# Patient Record
Sex: FEMALE | Race: WHITE | NOT HISPANIC OR LATINO | Employment: FULL TIME | URBAN - METROPOLITAN AREA
[De-identification: names, ages, dates, MRNs, and addresses within clinical notes are randomized per-mention and may not be internally consistent; named-entity substitution may affect disease eponyms.]

---

## 2023-10-18 ENCOUNTER — APPOINTMENT (OUTPATIENT)
Dept: GENERAL RADIOLOGY | Facility: HOSPITAL | Age: 43
End: 2023-10-18
Payer: COMMERCIAL

## 2023-10-18 ENCOUNTER — APPOINTMENT (OUTPATIENT)
Dept: CT IMAGING | Facility: HOSPITAL | Age: 43
End: 2023-10-18
Payer: COMMERCIAL

## 2023-10-18 ENCOUNTER — HOSPITAL ENCOUNTER (OUTPATIENT)
Facility: HOSPITAL | Age: 43
Setting detail: OBSERVATION
Discharge: HOME | End: 2023-10-19
Attending: EMERGENCY MEDICINE | Admitting: EMERGENCY MEDICINE
Payer: COMMERCIAL

## 2023-10-18 DIAGNOSIS — R55 SYNCOPE, UNSPECIFIED SYNCOPE TYPE: Primary | ICD-10-CM

## 2023-10-18 LAB
ALBUMIN SERPL-MCNC: 4 G/DL (ref 3.5–5.2)
ALBUMIN/GLOB SERPL: 1.3 G/DL
ALP SERPL-CCNC: 67 U/L (ref 39–117)
ALT SERPL W P-5'-P-CCNC: 14 U/L (ref 1–33)
ANION GAP SERPL CALCULATED.3IONS-SCNC: 12 MMOL/L (ref 5–15)
AST SERPL-CCNC: 14 U/L (ref 1–32)
BASOPHILS # BLD AUTO: 0.1 10*3/MM3 (ref 0–0.2)
BASOPHILS NFR BLD AUTO: 0.7 % (ref 0–1.5)
BILIRUB SERPL-MCNC: 0.2 MG/DL (ref 0–1.2)
BUN SERPL-MCNC: 13 MG/DL (ref 6–20)
BUN/CREAT SERPL: 14.8 (ref 7–25)
CALCIUM SPEC-SCNC: 8.9 MG/DL (ref 8.6–10.5)
CHLORIDE SERPL-SCNC: 102 MMOL/L (ref 98–107)
CO2 SERPL-SCNC: 22 MMOL/L (ref 22–29)
CREAT SERPL-MCNC: 0.88 MG/DL (ref 0.57–1)
DEPRECATED RDW RBC AUTO: 45.9 FL (ref 37–54)
EGFRCR SERPLBLD CKD-EPI 2021: 82.7 ML/MIN/1.73
EOSINOPHIL # BLD AUTO: 0.1 10*3/MM3 (ref 0–0.4)
EOSINOPHIL NFR BLD AUTO: 0.6 % (ref 0.3–6.2)
ERYTHROCYTE [DISTWIDTH] IN BLOOD BY AUTOMATED COUNT: 13.3 % (ref 12.3–15.4)
ETHANOL UR QL: <0.01 %
GLOBULIN UR ELPH-MCNC: 3 GM/DL
GLUCOSE SERPL-MCNC: 108 MG/DL (ref 65–99)
HCG SERPL QL: NEGATIVE
HCT VFR BLD AUTO: 39.3 % (ref 34–46.6)
HGB BLD-MCNC: 13.2 G/DL (ref 12–15.9)
HOLD SPECIMEN: NORMAL
LYMPHOCYTES # BLD AUTO: 1.9 10*3/MM3 (ref 0.7–3.1)
LYMPHOCYTES NFR BLD AUTO: 13.8 % (ref 19.6–45.3)
MAGNESIUM SERPL-MCNC: 2 MG/DL (ref 1.6–2.6)
MCH RBC QN AUTO: 31.9 PG (ref 26.6–33)
MCHC RBC AUTO-ENTMCNC: 33.7 G/DL (ref 31.5–35.7)
MCV RBC AUTO: 94.7 FL (ref 79–97)
MONOCYTES # BLD AUTO: 0.9 10*3/MM3 (ref 0.1–0.9)
MONOCYTES NFR BLD AUTO: 7 % (ref 5–12)
NEUTROPHILS NFR BLD AUTO: 10.5 10*3/MM3 (ref 1.7–7)
NEUTROPHILS NFR BLD AUTO: 77.9 % (ref 42.7–76)
NRBC BLD AUTO-RTO: 0 /100 WBC (ref 0–0.2)
PLATELET # BLD AUTO: 368 10*3/MM3 (ref 140–450)
PMV BLD AUTO: 8.1 FL (ref 6–12)
POTASSIUM SERPL-SCNC: 3.9 MMOL/L (ref 3.5–5.2)
PROT SERPL-MCNC: 7 G/DL (ref 6–8.5)
RBC # BLD AUTO: 4.15 10*6/MM3 (ref 3.77–5.28)
SODIUM SERPL-SCNC: 136 MMOL/L (ref 136–145)
TROPONIN T SERPL HS-MCNC: <6 NG/L
TSH SERPL DL<=0.05 MIU/L-ACNC: 1.94 UIU/ML (ref 0.27–4.2)
WBC NRBC COR # BLD: 13.5 10*3/MM3 (ref 3.4–10.8)
WHOLE BLOOD HOLD COAG: NORMAL

## 2023-10-18 PROCEDURE — 70450 CT HEAD/BRAIN W/O DYE: CPT

## 2023-10-18 PROCEDURE — 80053 COMPREHEN METABOLIC PANEL: CPT | Performed by: PHYSICIAN ASSISTANT

## 2023-10-18 PROCEDURE — 84484 ASSAY OF TROPONIN QUANT: CPT | Performed by: PHYSICIAN ASSISTANT

## 2023-10-18 PROCEDURE — 83735 ASSAY OF MAGNESIUM: CPT | Performed by: PHYSICIAN ASSISTANT

## 2023-10-18 PROCEDURE — 85025 COMPLETE CBC W/AUTO DIFF WBC: CPT | Performed by: PHYSICIAN ASSISTANT

## 2023-10-18 PROCEDURE — 93005 ELECTROCARDIOGRAM TRACING: CPT | Performed by: EMERGENCY MEDICINE

## 2023-10-18 PROCEDURE — 36415 COLL VENOUS BLD VENIPUNCTURE: CPT

## 2023-10-18 PROCEDURE — 82077 ASSAY SPEC XCP UR&BREATH IA: CPT | Performed by: PHYSICIAN ASSISTANT

## 2023-10-18 PROCEDURE — 71045 X-RAY EXAM CHEST 1 VIEW: CPT

## 2023-10-18 PROCEDURE — 82607 VITAMIN B-12: CPT | Performed by: NURSE PRACTITIONER

## 2023-10-18 PROCEDURE — 25810000003 SODIUM CHLORIDE 0.9 % SOLUTION: Performed by: PHYSICIAN ASSISTANT

## 2023-10-18 PROCEDURE — 84703 CHORIONIC GONADOTROPIN ASSAY: CPT | Performed by: EMERGENCY MEDICINE

## 2023-10-18 PROCEDURE — 82746 ASSAY OF FOLIC ACID SERUM: CPT | Performed by: NURSE PRACTITIONER

## 2023-10-18 PROCEDURE — 84443 ASSAY THYROID STIM HORMONE: CPT | Performed by: PHYSICIAN ASSISTANT

## 2023-10-18 PROCEDURE — 99284 EMERGENCY DEPT VISIT MOD MDM: CPT

## 2023-10-18 RX ORDER — SODIUM CHLORIDE 0.9 % (FLUSH) 0.9 %
10 SYRINGE (ML) INJECTION AS NEEDED
Status: DISCONTINUED | OUTPATIENT
Start: 2023-10-18 | End: 2023-10-19 | Stop reason: HOSPADM

## 2023-10-18 RX ADMIN — SODIUM CHLORIDE 1000 ML: 9 INJECTION, SOLUTION INTRAVENOUS at 21:59

## 2023-10-19 ENCOUNTER — APPOINTMENT (OUTPATIENT)
Dept: NUCLEAR MEDICINE | Facility: HOSPITAL | Age: 43
End: 2023-10-19
Payer: COMMERCIAL

## 2023-10-19 ENCOUNTER — APPOINTMENT (OUTPATIENT)
Dept: CARDIOLOGY | Facility: HOSPITAL | Age: 43
End: 2023-10-19
Payer: COMMERCIAL

## 2023-10-19 ENCOUNTER — APPOINTMENT (OUTPATIENT)
Dept: GENERAL RADIOLOGY | Facility: HOSPITAL | Age: 43
End: 2023-10-19
Payer: COMMERCIAL

## 2023-10-19 ENCOUNTER — APPOINTMENT (OUTPATIENT)
Dept: RESPIRATORY THERAPY | Facility: HOSPITAL | Age: 43
End: 2023-10-19
Payer: COMMERCIAL

## 2023-10-19 ENCOUNTER — APPOINTMENT (OUTPATIENT)
Dept: MRI IMAGING | Facility: HOSPITAL | Age: 43
End: 2023-10-19
Payer: COMMERCIAL

## 2023-10-19 VITALS
HEART RATE: 62 BPM | DIASTOLIC BLOOD PRESSURE: 65 MMHG | WEIGHT: 190 LBS | OXYGEN SATURATION: 98 % | HEIGHT: 67 IN | RESPIRATION RATE: 18 BRPM | SYSTOLIC BLOOD PRESSURE: 120 MMHG | BODY MASS INDEX: 29.82 KG/M2 | TEMPERATURE: 98.4 F

## 2023-10-19 PROBLEM — R55 SYNCOPE: Status: ACTIVE | Noted: 2023-10-19

## 2023-10-19 LAB
AMPHET+METHAMPHET UR QL: NEGATIVE
ANION GAP SERPL CALCULATED.3IONS-SCNC: 10 MMOL/L (ref 5–15)
BACTERIA UR QL AUTO: ABNORMAL /HPF
BARBITURATES UR QL SCN: NEGATIVE
BASOPHILS # BLD AUTO: 0.1 10*3/MM3 (ref 0–0.2)
BASOPHILS NFR BLD AUTO: 0.5 % (ref 0–1.5)
BENZODIAZ UR QL SCN: NEGATIVE
BH CV ECHO MEAS - ACS: 1.72 CM
BH CV ECHO MEAS - AO MAX PG: 6.5 MMHG
BH CV ECHO MEAS - AO MEAN PG: 3.4 MMHG
BH CV ECHO MEAS - AO ROOT DIAM: 2.8 CM
BH CV ECHO MEAS - AO V2 MAX: 127.6 CM/SEC
BH CV ECHO MEAS - AO V2 VTI: 26.3 CM
BH CV ECHO MEAS - AVA(I,D): 1.72 CM2
BH CV ECHO MEAS - EDV(CUBED): 59.6 ML
BH CV ECHO MEAS - EDV(MOD-SP4): 72.3 ML
BH CV ECHO MEAS - EF(MOD-BP): 59 %
BH CV ECHO MEAS - EF(MOD-SP4): 58.7 %
BH CV ECHO MEAS - ESV(CUBED): 19.7 ML
BH CV ECHO MEAS - ESV(MOD-SP4): 29.8 ML
BH CV ECHO MEAS - FS: 30.9 %
BH CV ECHO MEAS - IVS/LVPW: 1.06 CM
BH CV ECHO MEAS - IVSD: 0.84 CM
BH CV ECHO MEAS - LA DIMENSION: 3.6 CM
BH CV ECHO MEAS - LV DIASTOLIC VOL/BSA (35-75): 36.5 CM2
BH CV ECHO MEAS - LV MASS(C)D: 91.9 GRAMS
BH CV ECHO MEAS - LV MAX PG: 3.4 MMHG
BH CV ECHO MEAS - LV MEAN PG: 1.7 MMHG
BH CV ECHO MEAS - LV SYSTOLIC VOL/BSA (12-30): 15.1 CM2
BH CV ECHO MEAS - LV V1 MAX: 92.5 CM/SEC
BH CV ECHO MEAS - LV V1 VTI: 18.3 CM
BH CV ECHO MEAS - LVIDD: 3.9 CM
BH CV ECHO MEAS - LVIDS: 2.7 CM
BH CV ECHO MEAS - LVOT AREA: 2.47 CM2
BH CV ECHO MEAS - LVOT DIAM: 1.77 CM
BH CV ECHO MEAS - LVPWD: 0.79 CM
BH CV ECHO MEAS - MR MAX PG: 29.2 MMHG
BH CV ECHO MEAS - MR MAX VEL: 269.7 CM/SEC
BH CV ECHO MEAS - MV A MAX VEL: 42.3 CM/SEC
BH CV ECHO MEAS - MV DEC SLOPE: 463.3 CM/SEC2
BH CV ECHO MEAS - MV DEC TIME: 0.16 SEC
BH CV ECHO MEAS - MV E MAX VEL: 74.5 CM/SEC
BH CV ECHO MEAS - MV E/A: 1.76
BH CV ECHO MEAS - MV MAX PG: 3.1 MMHG
BH CV ECHO MEAS - MV MEAN PG: 0.81 MMHG
BH CV ECHO MEAS - MV V2 VTI: 27.7 CM
BH CV ECHO MEAS - MVA(VTI): 1.63 CM2
BH CV ECHO MEAS - PA ACC TIME: 0.14 SEC
BH CV ECHO MEAS - PA V2 MAX: 86.8 CM/SEC
BH CV ECHO MEAS - PULM A REVS DUR: 0.07 SEC
BH CV ECHO MEAS - PULM A REVS VEL: 24.4 CM/SEC
BH CV ECHO MEAS - PULM DIAS VEL: 60.9 CM/SEC
BH CV ECHO MEAS - PULM S/D: 0.96
BH CV ECHO MEAS - PULM SYS VEL: 58.5 CM/SEC
BH CV ECHO MEAS - RAP SYSTOLE: 3 MMHG
BH CV ECHO MEAS - RV MAX PG: 1.63 MMHG
BH CV ECHO MEAS - RV V1 MAX: 63.9 CM/SEC
BH CV ECHO MEAS - RV V1 VTI: 14.5 CM
BH CV ECHO MEAS - RVDD: 3.2 CM
BH CV ECHO MEAS - RVSP: 18.9 MMHG
BH CV ECHO MEAS - SI(MOD-SP4): 21.4 ML/M2
BH CV ECHO MEAS - SV(LVOT): 45.2 ML
BH CV ECHO MEAS - SV(MOD-SP4): 42.4 ML
BH CV ECHO MEAS - TR MAX PG: 15.9 MMHG
BH CV ECHO MEAS - TR MAX VEL: 198.3 CM/SEC
BH CV REST NUCLEAR ISOTOPE DOSE: 11 MCI
BH CV STRESS BP STAGE 1: NORMAL
BH CV STRESS BP STAGE 2: NORMAL
BH CV STRESS BP STAGE 3: NORMAL
BH CV STRESS DURATION MIN STAGE 1: 3
BH CV STRESS DURATION MIN STAGE 2: 3
BH CV STRESS DURATION MIN STAGE 3: 4
BH CV STRESS DURATION SEC STAGE 1: 0
BH CV STRESS DURATION SEC STAGE 2: 0
BH CV STRESS DURATION SEC STAGE 3: 0
BH CV STRESS GRADE STAGE 1: 10
BH CV STRESS GRADE STAGE 2: 12
BH CV STRESS GRADE STAGE 3: 14
BH CV STRESS HR STAGE 1: 95
BH CV STRESS HR STAGE 2: 116
BH CV STRESS HR STAGE 3: 148
BH CV STRESS METS STAGE 1: 5
BH CV STRESS METS STAGE 2: 7.5
BH CV STRESS METS STAGE 3: 10
BH CV STRESS NUCLEAR ISOTOPE DOSE: 33 MCI
BH CV STRESS PROTOCOL 1: NORMAL
BH CV STRESS RECOVERY BP: NORMAL MMHG
BH CV STRESS RECOVERY HR: 98 BPM
BH CV STRESS SPEED STAGE 1: 1.7
BH CV STRESS SPEED STAGE 2: 2.5
BH CV STRESS SPEED STAGE 3: 3.4
BH CV STRESS STAGE 1: 1
BH CV STRESS STAGE 2: 2
BH CV STRESS STAGE 3: 3
BH CV XLRA MEAS LEFT DIST CCA EDV: 24.2 CM/SEC
BH CV XLRA MEAS LEFT DIST CCA PSV: 95.9 CM/SEC
BH CV XLRA MEAS LEFT DIST ICA EDV: -28.1 CM/SEC
BH CV XLRA MEAS LEFT DIST ICA PSV: -77.5 CM/SEC
BH CV XLRA MEAS LEFT ICA/CCA RATIO: 0.99
BH CV XLRA MEAS LEFT PROX CCA EDV: 27.1 CM/SEC
BH CV XLRA MEAS LEFT PROX CCA PSV: 111 CM/SEC
BH CV XLRA MEAS LEFT PROX ECA PSV: -105 CM/SEC
BH CV XLRA MEAS LEFT PROX ICA EDV: -31 CM/SEC
BH CV XLRA MEAS LEFT PROX ICA PSV: -110 CM/SEC
BH CV XLRA MEAS LEFT PROX SCLA PSV: 167 CM/SEC
BH CV XLRA MEAS LEFT VERTEBRAL A EDV: 17.4 CM/SEC
BH CV XLRA MEAS LEFT VERTEBRAL A PSV: 72.6 CM/SEC
BH CV XLRA MEAS RIGHT DIST CCA EDV: 18 CM/SEC
BH CV XLRA MEAS RIGHT DIST CCA PSV: 83.2 CM/SEC
BH CV XLRA MEAS RIGHT DIST ICA EDV: -39.1 CM/SEC
BH CV XLRA MEAS RIGHT DIST ICA PSV: -99.4 CM/SEC
BH CV XLRA MEAS RIGHT ICA/CCA RATIO: 0.93
BH CV XLRA MEAS RIGHT PROX CCA EDV: 24.2 CM/SEC
BH CV XLRA MEAS RIGHT PROX CCA PSV: 106 CM/SEC
BH CV XLRA MEAS RIGHT PROX ECA PSV: -99.4 CM/SEC
BH CV XLRA MEAS RIGHT PROX ICA EDV: -25.5 CM/SEC
BH CV XLRA MEAS RIGHT PROX ICA PSV: -71.4 CM/SEC
BH CV XLRA MEAS RIGHT PROX SCLA PSV: 157 CM/SEC
BH CV XLRA MEAS RIGHT VERTEBRAL A EDV: -14.3 CM/SEC
BH CV XLRA MEAS RIGHT VERTEBRAL A PSV: -56.5 CM/SEC
BILIRUB UR QL STRIP: NEGATIVE
BUN SERPL-MCNC: 11 MG/DL (ref 6–20)
BUN/CREAT SERPL: 14.7 (ref 7–25)
CALCIUM SPEC-SCNC: 8.7 MG/DL (ref 8.6–10.5)
CANNABINOIDS SERPL QL: POSITIVE
CHLORIDE SERPL-SCNC: 106 MMOL/L (ref 98–107)
CLARITY UR: CLEAR
CO2 SERPL-SCNC: 23 MMOL/L (ref 22–29)
COCAINE UR QL: NEGATIVE
COLOR UR: YELLOW
CREAT SERPL-MCNC: 0.75 MG/DL (ref 0.57–1)
DEPRECATED RDW RBC AUTO: 46.4 FL (ref 37–54)
EGFRCR SERPLBLD CKD-EPI 2021: 100.2 ML/MIN/1.73
EOSINOPHIL # BLD AUTO: 0 10*3/MM3 (ref 0–0.4)
EOSINOPHIL NFR BLD AUTO: 0.4 % (ref 0.3–6.2)
ERYTHROCYTE [DISTWIDTH] IN BLOOD BY AUTOMATED COUNT: 13.4 % (ref 12.3–15.4)
FOLATE SERPL-MCNC: 8.85 NG/ML (ref 4.78–24.2)
GEN 5 2HR TROPONIN T REFLEX: <6 NG/L
GLUCOSE SERPL-MCNC: 133 MG/DL (ref 65–99)
GLUCOSE UR STRIP-MCNC: NEGATIVE MG/DL
HBA1C MFR BLD: 5.7 % (ref 4.8–5.6)
HCT VFR BLD AUTO: 37.8 % (ref 34–46.6)
HGB BLD-MCNC: 12.7 G/DL (ref 12–15.9)
HGB UR QL STRIP.AUTO: NEGATIVE
HYALINE CASTS UR QL AUTO: ABNORMAL /LPF
KETONES UR QL STRIP: NEGATIVE
LEUKOCYTE ESTERASE UR QL STRIP.AUTO: NEGATIVE
LYMPHOCYTES # BLD AUTO: 2.4 10*3/MM3 (ref 0.7–3.1)
LYMPHOCYTES NFR BLD AUTO: 20.9 % (ref 19.6–45.3)
MAXIMAL PREDICTED HEART RATE: 175 BPM
MCH RBC QN AUTO: 31.7 PG (ref 26.6–33)
MCHC RBC AUTO-ENTMCNC: 33.5 G/DL (ref 31.5–35.7)
MCV RBC AUTO: 94.4 FL (ref 79–97)
METHADONE UR QL SCN: NEGATIVE
MONOCYTES # BLD AUTO: 0.7 10*3/MM3 (ref 0.1–0.9)
MONOCYTES NFR BLD AUTO: 6.3 % (ref 5–12)
NEUTROPHILS NFR BLD AUTO: 71.9 % (ref 42.7–76)
NEUTROPHILS NFR BLD AUTO: 8.2 10*3/MM3 (ref 1.7–7)
NITRITE UR QL STRIP: POSITIVE
NRBC BLD AUTO-RTO: 0 /100 WBC (ref 0–0.2)
OPIATES UR QL: NEGATIVE
OXYCODONE UR QL SCN: NEGATIVE
PERCENT MAX PREDICTED HR: 84.57 %
PH UR STRIP.AUTO: 7.5 [PH] (ref 5–8)
PLATELET # BLD AUTO: 333 10*3/MM3 (ref 140–450)
PMV BLD AUTO: 8 FL (ref 6–12)
POTASSIUM SERPL-SCNC: 3.8 MMOL/L (ref 3.5–5.2)
PROT UR QL STRIP: NEGATIVE
QT INTERVAL: 394 MS
QTC INTERVAL: 401 MS
RBC # BLD AUTO: 4 10*6/MM3 (ref 3.77–5.28)
RBC # UR STRIP: ABNORMAL /HPF
REF LAB TEST METHOD: ABNORMAL
SODIUM SERPL-SCNC: 139 MMOL/L (ref 136–145)
SP GR UR STRIP: 1.01 (ref 1–1.03)
SQUAMOUS #/AREA URNS HPF: ABNORMAL /HPF
STRESS BASELINE BP: NORMAL MMHG
STRESS BASELINE HR: 70 BPM
STRESS PERCENT HR: 99 %
STRESS POST ESTIMATED WORKLOAD: 10.1 METS
STRESS POST EXERCISE DUR MIN: 10 MIN
STRESS POST EXERCISE DUR SEC: 1 SEC
STRESS POST PEAK BP: NORMAL MMHG
STRESS POST PEAK HR: 148 BPM
STRESS TARGET HR: 149 BPM
TROPONIN T DELTA: NORMAL
UROBILINOGEN UR QL STRIP: ABNORMAL
VIT B12 BLD-MCNC: 318 PG/ML (ref 211–946)
WBC # UR STRIP: ABNORMAL /HPF
WBC NRBC COR # BLD: 11.4 10*3/MM3 (ref 3.4–10.8)

## 2023-10-19 PROCEDURE — 83036 HEMOGLOBIN GLYCOSYLATED A1C: CPT | Performed by: NURSE PRACTITIONER

## 2023-10-19 PROCEDURE — 78452 HT MUSCLE IMAGE SPECT MULT: CPT | Performed by: INTERNAL MEDICINE

## 2023-10-19 PROCEDURE — 99204 OFFICE O/P NEW MOD 45 MIN: CPT | Performed by: INTERNAL MEDICINE

## 2023-10-19 PROCEDURE — 80048 BASIC METABOLIC PNL TOTAL CA: CPT | Performed by: PHYSICIAN ASSISTANT

## 2023-10-19 PROCEDURE — G0378 HOSPITAL OBSERVATION PER HR: HCPCS

## 2023-10-19 PROCEDURE — 70551 MRI BRAIN STEM W/O DYE: CPT

## 2023-10-19 PROCEDURE — 99214 OFFICE O/P EST MOD 30 MIN: CPT | Performed by: NURSE PRACTITIONER

## 2023-10-19 PROCEDURE — 80307 DRUG TEST PRSMV CHEM ANLYZR: CPT | Performed by: PHYSICIAN ASSISTANT

## 2023-10-19 PROCEDURE — 85025 COMPLETE CBC W/AUTO DIFF WBC: CPT | Performed by: PHYSICIAN ASSISTANT

## 2023-10-19 PROCEDURE — A9502 TC99M TETROFOSMIN: HCPCS | Performed by: EMERGENCY MEDICINE

## 2023-10-19 PROCEDURE — 93306 TTE W/DOPPLER COMPLETE: CPT

## 2023-10-19 PROCEDURE — 72050 X-RAY EXAM NECK SPINE 4/5VWS: CPT

## 2023-10-19 PROCEDURE — 93018 CV STRESS TEST I&R ONLY: CPT | Performed by: INTERNAL MEDICINE

## 2023-10-19 PROCEDURE — 0 TECHNETIUM TETROFOSMIN KIT: Performed by: EMERGENCY MEDICINE

## 2023-10-19 PROCEDURE — 93306 TTE W/DOPPLER COMPLETE: CPT | Performed by: INTERNAL MEDICINE

## 2023-10-19 PROCEDURE — 97161 PT EVAL LOW COMPLEX 20 MIN: CPT

## 2023-10-19 PROCEDURE — 93017 CV STRESS TEST TRACING ONLY: CPT

## 2023-10-19 PROCEDURE — 93880 EXTRACRANIAL BILAT STUDY: CPT

## 2023-10-19 PROCEDURE — 78452 HT MUSCLE IMAGE SPECT MULT: CPT

## 2023-10-19 PROCEDURE — 81001 URINALYSIS AUTO W/SCOPE: CPT | Performed by: PHYSICIAN ASSISTANT

## 2023-10-19 RX ORDER — SODIUM CHLORIDE 9 MG/ML
40 INJECTION, SOLUTION INTRAVENOUS AS NEEDED
Status: DISCONTINUED | OUTPATIENT
Start: 2023-10-19 | End: 2023-10-19 | Stop reason: HOSPADM

## 2023-10-19 RX ORDER — ESCITALOPRAM OXALATE 20 MG/1
20 TABLET ORAL DAILY
COMMUNITY

## 2023-10-19 RX ORDER — AMOXICILLIN 250 MG
2 CAPSULE ORAL 2 TIMES DAILY
Status: DISCONTINUED | OUTPATIENT
Start: 2023-10-19 | End: 2023-10-19 | Stop reason: HOSPADM

## 2023-10-19 RX ORDER — ACETAMINOPHEN 325 MG/1
650 TABLET ORAL EVERY 4 HOURS PRN
Status: DISCONTINUED | OUTPATIENT
Start: 2023-10-19 | End: 2023-10-19 | Stop reason: HOSPADM

## 2023-10-19 RX ORDER — SODIUM CHLORIDE 0.9 % (FLUSH) 0.9 %
10 SYRINGE (ML) INJECTION EVERY 12 HOURS SCHEDULED
Status: DISCONTINUED | OUTPATIENT
Start: 2023-10-19 | End: 2023-10-19 | Stop reason: HOSPADM

## 2023-10-19 RX ORDER — POLYETHYLENE GLYCOL 3350 17 G/17G
17 POWDER, FOR SOLUTION ORAL DAILY PRN
Status: DISCONTINUED | OUTPATIENT
Start: 2023-10-19 | End: 2023-10-19 | Stop reason: HOSPADM

## 2023-10-19 RX ORDER — CHOLECALCIFEROL (VITAMIN D3) 125 MCG
5 CAPSULE ORAL NIGHTLY PRN
Status: DISCONTINUED | OUTPATIENT
Start: 2023-10-19 | End: 2023-10-19 | Stop reason: HOSPADM

## 2023-10-19 RX ORDER — ASPIRIN 81 MG/1
81 TABLET ORAL DAILY
COMMUNITY

## 2023-10-19 RX ORDER — SODIUM CHLORIDE 0.9 % (FLUSH) 0.9 %
10 SYRINGE (ML) INJECTION AS NEEDED
Status: DISCONTINUED | OUTPATIENT
Start: 2023-10-19 | End: 2023-10-19 | Stop reason: HOSPADM

## 2023-10-19 RX ORDER — BISACODYL 5 MG/1
5 TABLET, DELAYED RELEASE ORAL DAILY PRN
Status: DISCONTINUED | OUTPATIENT
Start: 2023-10-19 | End: 2023-10-19 | Stop reason: HOSPADM

## 2023-10-19 RX ORDER — ONDANSETRON 2 MG/ML
4 INJECTION INTRAMUSCULAR; INTRAVENOUS EVERY 6 HOURS PRN
Status: DISCONTINUED | OUTPATIENT
Start: 2023-10-19 | End: 2023-10-19 | Stop reason: HOSPADM

## 2023-10-19 RX ORDER — BISACODYL 10 MG
10 SUPPOSITORY, RECTAL RECTAL DAILY PRN
Status: DISCONTINUED | OUTPATIENT
Start: 2023-10-19 | End: 2023-10-19 | Stop reason: HOSPADM

## 2023-10-19 RX ORDER — ACETAMINOPHEN 500 MG
1000 TABLET ORAL ONCE
Status: COMPLETED | OUTPATIENT
Start: 2023-10-19 | End: 2023-10-19

## 2023-10-19 RX ADMIN — TETROFOSMIN 1 DOSE: 1.38 INJECTION, POWDER, LYOPHILIZED, FOR SOLUTION INTRAVENOUS at 10:34

## 2023-10-19 RX ADMIN — ACETAMINOPHEN 650 MG: 325 TABLET, FILM COATED ORAL at 09:52

## 2023-10-19 RX ADMIN — Medication 10 ML: at 01:10

## 2023-10-19 RX ADMIN — ACETAMINOPHEN 1000 MG: 500 TABLET, FILM COATED ORAL at 00:42

## 2023-10-19 RX ADMIN — TETROFOSMIN 1 DOSE: 1.38 INJECTION, POWDER, LYOPHILIZED, FOR SOLUTION INTRAVENOUS at 11:21

## 2023-10-19 RX ADMIN — Medication 10 ML: at 09:54

## 2023-10-19 RX ADMIN — ACETAMINOPHEN 650 MG: 325 TABLET, FILM COATED ORAL at 13:33

## 2023-10-19 NOTE — CASE MANAGEMENT/SOCIAL WORK
Discharge Planning Assessment  [facility]     Patient Name: Dawn Jarrell  MRN: [id number]  Today's Date: 10/19/2023    Admit Date: 10/18/2023    Plan: Return home with family   Discharge Needs Assessment       Row Name 10/19/23 1509       Living Environment    People in Home child(gonsalo), adult;child(gonsalo), dependent    Current Living Arrangements home    Potentially Unsafe Housing Conditions none    Primary Care Provided by self    Provides Primary Care For no one, unable/limited ability to care for self    Family Caregiver if Needed child(gonsalo), adult    Able to Return to Prior Arrangements yes       Resource/Environmental Concerns    Transportation Concerns none       Transition Planning    Patient/Family Anticipated Services at Transition none    Transportation Anticipated car, drives self       Discharge Needs Assessment    Concerns to be Addressed discharge planning    Anticipated Changes Related to Illness none    Equipment Needed After Discharge none                   Discharge Plan       Row Name 10/19/23 0606       Plan    Plan Return home with family    Patient/Family in Agreement with Plan yes    Plan Comments CM met with Ms. Jarrell who lives at home with her adult children, drives and is independent. She does not have a PCP but believes her insurance plan has assigned her to a NP at [facility] in [place]. CM placed contact info on her AVS for Bonnie Jane NP in [place] and she plans to make an appointment. Denies difficulty with transportation or paying for medications.      Row Name 10/19/23 0381       Plan    Plan Needs CM interview. Expected return home    Plan Comments Barriers: CM attempted several times but off floor for ordered testing: Pending Stress test, Echo, MRI Brain, Carotid US. PT eval pending. CM will need to interview if appropriate                  Demographic Summary       Row Name 10/19/23 1417       General Information    Admission Type observation    Arrived From  emergency department    Referral Source admission list    Reason for Consult discharge planning    Preferred Language English       Contact Information    Permission Granted to Share Info With                    Functional Status       Row Name 10/19/23 1505       Functional Status    Usual Activity Tolerance excellent    Current Activity Tolerance good       Functional Status, IADL    Medications independent    Meal Preparation independent    Housekeeping independent    Laundry independent    Shopping independent    IADL Comments independent                    Maintained distance greater than six feet and spent less than 15 minutes in the room.     Yaneli KERR,RN Case Manager  [facility]  Phone: desk- [phone] cell- [phone]

## 2023-10-19 NOTE — CONSULTS
Primary Care Provider: Provider, No Known     Consult requested by:  Dr. Betancourt    Reason for Consultation: Neurological evaluation, syncope     History taken from: patient chart RN    Chief complaint: passed out        SUBJECTIVE:    History of present illness: Background per H&P:  Patient is a 43-year-old female that presents to the ED after a syncopal episode today.  Patient was sitting down with family and said she felt hot and nauseous right before she fainted.  Patient denies syncopal episodes in the past.  Patient lost consciousness for 5 to 10 seconds and possibly hit head on the concrete floor.  She states she has a headache, drowsiness, weakness, blurry vision in the right eye.  Patient states she ate food and was hydrated today.  She had half a beer prior to her syncopal episode.  She denies any cardiac history and denies chest pain or dyspnea.  Patient denies urinary symptoms, abdominal pain, recent travel, exposure to anyone sick, numbness, tingling, tinnitus.     - Portions of the above HPI were copied from previous encounters and edited as appropriate. PMH as detailed below.     HX as above.  Patient states that she was sitting in her cousins garage when all of a sudden she had sudden feelings of feeling hot and nauseous.  That the last thing she can remember.  She felt like that 2 to 3 minutes prior to the episode.  It is unclear how long exactly she was down according to her but she felt very out of it and confused afterwards.  She did hit her head on the concrete floor.  She denies any history of anything similar to this in the past.  No focal neurological deficit, no vision changes, no facial asymmetry.  No changes in medical history, no changes in medicines.  No changes in lifestyle or home stressors.     Review of Systems   Constitutional: Negative.    HENT: Negative.     Eyes:  Negative for visual disturbance.   Respiratory: Negative.     Cardiovascular: Negative.    Gastrointestinal:   Positive for nausea.   Endocrine: Negative.    Genitourinary: Negative.    Musculoskeletal: Negative.    Skin: Negative.    Allergic/Immunologic: Negative.    Neurological:  Positive for syncope. Negative for dizziness, tremors, seizures, facial asymmetry, speech difficulty, weakness, light-headedness, numbness and headaches.   Hematological: Negative.    Psychiatric/Behavioral:  Negative for confusion.       PATIENT HISTORY:  History reviewed. No pertinent past medical history.,   Past Surgical History:   Procedure Laterality Date    HYSTERECTOMY      HYSTEROSCOPY ENDOMETRIAL ABLATION Bilateral    ,   Family History   Problem Relation Age of Onset    Stroke Mother     Colon cancer Father    ,   Social History     Tobacco Use    Smoking status: Every Day     Packs/day: .5     Types: Cigarettes    Smokeless tobacco: Never   Vaping Use    Vaping Use: Never used   Substance Use Topics    Alcohol use: Yes     Alcohol/week: 1.0 standard drink of alcohol     Types: 1 Cans of beer per week     Comment: social drinker    Drug use: Never   ,   Prior to Admission medications    Medication Sig Start Date End Date Taking? Authorizing Provider   aspirin 81 MG EC tablet Take 1 tablet by mouth Daily.   Yes ProviderNicolette MD   escitalopram (LEXAPRO) 20 MG tablet Take 1 tablet by mouth Daily.   Yes Provider, MD Nicolette    Allergies:  Latex    Current Facility-Administered Medications   Medication Dose Route Frequency Provider Last Rate Last Admin    acetaminophen (TYLENOL) tablet 650 mg  650 mg Oral Q4H PRN Salvador Abrams PA        sennosides-docusate (PERICOLACE) 8.6-50 MG per tablet 2 tablet  2 tablet Oral BID Salvador Abrams PA        And    polyethylene glycol (MIRALAX) packet 17 g  17 g Oral Daily PRN Salvador Abrams PA        And    bisacodyl (DULCOLAX) EC tablet 5 mg  5 mg Oral Daily PRN Salvador Abrams PA        And    bisacodyl (DULCOLAX) suppository 10 mg  10 mg Rectal Daily PRN Jose Alberto  ERIKA Purdy        melatonin tablet 5 mg  5 mg Oral Nightly PRN Salvador Abrams PA        ondansetron (ZOFRAN) injection 4 mg  4 mg Intravenous Q6H PRN Salvador Abrams PA        sodium chloride 0.9 % flush 10 mL  10 mL Intravenous PRN Salvador Abrams PA        sodium chloride 0.9 % flush 10 mL  10 mL Intravenous Q12H Salvador Abrams PA   10 mL at 10/19/23 0110    sodium chloride 0.9 % flush 10 mL  10 mL Intravenous PRN Salvador Abrams PA        sodium chloride 0.9 % infusion 40 mL  40 mL Intravenous ERICN Salvador Abrams PA            ________________________________________________________        OBJECTIVE:    PHYSICAL EXAM:    Constitutional: The patient is in no apparent distress, bright awake and alert. There is no shortness of breath.     PSYCHIATRIC: Mood/affect normal, judgement normal, appropriate    HEENT:Normocephalic, atraumatic.     Chest: Breathing unlabored    Cardiac: Regular rate and rhythm.     Extremities:  No clubbing, cyanosis or edema.    NEUROLOGICAL:    Cognition:   Fully oriented.  Fund of knowledge excellent.  Concentration and attention normal.   Language normal with normal comprehension, fluent speech, intact repetition and naming.   Short and long term memory appears intact    Cranial nerves;    II - pupils bilaterally equal reacting to light,  No new Visual field deficits;  Fundoscopic exam- Not able to be done, non-dilated exam  III,IV,VI: EOMI with no diplopia  V: Normal facial sensations  VII: No facial asymmetry,  VIII: No New hearing abnormality  IX, X, XI: normal gag and shoulder shrug;  XII: tongue is in the midline.    Sensory:  Intact to light touch in all extremities.     Motor: Strength 5/5 bilaterally upper and lower extremities. No involuntary movements present. Normal tone and bulk.    Cerebellar: Finger to nose and mirror movements normal bilaterally.    Gait and balance: Deferred.     Physical exam performed by Abigail Sharp  ARNP.  ________________________________________________________   RESULTS REVIEW:    VITAL SIGNS:   Temp:  [98.3 °F (36.8 °C)-98.6 °F (37 °C)] 98.6 °F (37 °C)  Heart Rate:  [58-79] 70  Resp:  [14-21] 17  BP: (108-160)/(70-91) 108/70     LABS:      Lab 10/19/23  0357 10/18/23  2145   WBC 11.40* 13.50*   HEMOGLOBIN 12.7 13.2   HEMATOCRIT 37.8 39.3   PLATELETS 333 368   NEUTROS ABS 8.20* 10.50*   LYMPHS ABS 2.40 1.90   MONOS ABS 0.70 0.90   EOS ABS 0.00 0.10   MCV 94.4 94.7         Lab 10/19/23  0357 10/18/23  2145   SODIUM 139 136   POTASSIUM 3.8 3.9   CHLORIDE 106 102   CO2 23.0 22.0   ANION GAP 10.0 12.0   BUN 11 13   CREATININE 0.75 0.88   EGFR 100.2 82.7   GLUCOSE 133* 108*   CALCIUM 8.7 8.9   MAGNESIUM  --  2.0   TSH  --  1.940         Lab 10/18/23  2145   TOTAL PROTEIN 7.0   ALBUMIN 4.0   GLOBULIN 3.0   ALT (SGPT) 14   AST (SGOT) 14   BILIRUBIN 0.2   ALK PHOS 67         Lab 10/18/23  2357 10/18/23  2145   HSTROP T <6 <6                     Lab Results   Component Value Date    TSH 1.940 10/18/2023       IMAGING STUDIES:  XR Spine Cervical Complete 4 or 5 View    Result Date: 10/19/2023  Impression: No acute osseous abnormality or significant degenerative change. Electronically Signed: Danika Cardenas MD  10/19/2023 12:30 AM EDT  Workstation ID: [id number]    CT Head Without Contrast    Result Date: 10/18/2023  Impression: No acute intracranial process identified. Electronically Signed: Danika Cardenas MD  10/18/2023 11:29 PM EDT  Workstation ID: [id number]    XR Chest 1 View    Result Date: 10/18/2023  No acute cardiopulmonary abnormality. Electronically Signed: Tony Braun MD  10/18/2023 10:17 PM EDT  Workstation ID: [id number]     I reviewed the patient's new clinical results.    ________________________________________________________     PROBLEM LIST:    Syncope            ASSESSMENT/PLAN:      Syncope vs seizure. Pt felt nauseous and hot prior, possible vasovagal but cannot completely rule out seizure. This is a  single event with negative MRI, recommend to monitor.   - CT head: Negative for hemorrhage or mass effect  - MRI brain without reviewed- negative   - Carotid duplex normal flow   - EKG: Sinus rhythm, rate 62  - A1C:5.70, B12: P TSH: 1.940, mag 2.0  - Check Orthostatic Vitals    - Stress test per Cardiology    -  Discussed that with a single event with negative MRI brain, will hold off on starting AED at this time. If patient has another episode in the future, further work up and management will need to be completed.   - Follow up with PCP       SEIZURE/SYNCOPE INSTRUCTIONS:  -Recommended to observe all seizure precautions, including, but not limited to:   -No driving until seizure free for more than 3 months- as per State driving regulation / law;   -Avoid all high-risk activity, Take showers instead of baths, Avoid swimming without observation, Avoid open heat sources, Avoid working at heights, and Avoid engaging in all potentially hazardous activities.   -Patient expressed clear understanding.    2.  Urinary tract infection  -Culture pending, antibiotics per primary    There are no further recommendations. Will sign off, please call with any questions or concerns.    I discussed the patient's findings and my recommendations with patient and family    Emerita Sharp, APRN  10/19/23  09:13 EDT

## 2023-10-19 NOTE — ED NOTES
Nursing report ED to floor  Dawn Jarrell  43 y.o.  female    HPI:   Chief Complaint   Patient presents with    Syncope       Admitting doctor:   Barron Betancourt DO    Admitting diagnosis:   The encounter diagnosis was Syncope, unspecified syncope type.    Code status:   Current Code Status       Date Active Code Status Order ID Comments User Context       10/19/2023 0004 CPR (Attempt to Resuscitate) [id number]  Salvador Abrams PA ED        Question Answer    Code Status (Patient has no pulse and is not breathing) CPR (Attempt to Resuscitate)    Medical Interventions (Patient has pulse or is breathing) Full Support    Level Of Support Discussed With Patient                    Allergies:   Latex    Isolation:  No active isolations     Fall Risk:  Fall Risk Assessment was completed, and patient is at low risk for falls.   Predictive Model Details         1 (Low) Factor Value    Calculated 10/19/2023 00:13 Age 43    Risk of Fall Model Musculoskeletal Assessment WDL     Active Peripheral IV Present     Imaging order in this encounter Present     Drug Use Not Asked     Skin Assessment WDL     Magnesium 2 mg/dL     Mykel Scale not on file     Financial Class Private Insurance     Diastolic BP 75     Peripheral Vascular Assessment WDL     Calcium 8.9 mg/dL     Tobacco Use Not Asked     Gastrointestinal Assessment WDL     Number of Distinct Medication Classes administered 1     Albumin 4 g/dL     Chloride 102 mmol/L     Cardiac Assessment WDL     Respiratory Rate 14     Creatinine 0.88 mg/dL     Days after Admission 0.129     Potassium 3.9 mmol/L     Total Bilirubin 0.2 mg/dL     ALT 14 U/L         Weight:       10/18/23  2105   Weight: 86.2 kg (190 lb)       Intake and Output  No intake or output data in the 24 hours ending 10/19/23 0013    Diet:   Dietary Orders (From admission, onward)       Start     Ordered    10/19/23 0010  Diet: Regular/House Diet; Texture: Regular Texture (IDDSI 7); Fluid  Consistency: Thin (IDDSI 0)  Diet Effective Now        References:    Diet Order [place]   Question Answer Comment   Diets: Regular/House Diet    Texture: Regular Texture (IDDSI 7)    Fluid Consistency: Thin (IDDSI 0)        10/19/23 0009                     Most recent vitals:   Vitals:    10/18/23 2116 10/18/23 2155 10/18/23 2156 10/18/23 2346   BP: 154/88 144/88 160/91 129/75   Patient Position:  Lying Sitting    Pulse: 65 62 79 61   Resp:  21 14    Temp:       SpO2: 100%   99%   Weight:       Height:           Active LDAs/IV Access:   Lines, Drains & Airways       Active LDAs       Name Placement date Placement time Site Days    Peripheral IV 10/18/23 2149 Left Antecubital 10/18/23  2149  Antecubital  less than 1                    Skin Condition:   Skin Assessments (last day)       None             Labs (abnormal labs have a star):   Labs Reviewed   COMPREHENSIVE METABOLIC PANEL - Abnormal; Notable for the following components:       Result Value    Glucose 108 (*)     All other components within normal limits    Narrative:     GFR Normal >60  Chronic Kidney Disease <60  Kidney Failure <15     CBC WITH AUTO DIFFERENTIAL - Abnormal; Notable for the following components:    WBC 13.50 (*)     Neutrophil % 77.9 (*)     Lymphocyte % 13.8 (*)     Neutrophils, Absolute 10.50 (*)     All other components within normal limits   TROPONIN - Normal    Narrative:     High Sensitive Troponin T Reference Range:  <10.0 ng/L- Negative Female for AMI  <15.0 ng/L- Negative Male for AMI  >=10 - Abnormal Female indicating possible myocardial injury.  >=15 - Abnormal Male indicating possible myocardial injury.   Clinicians would have to utilize clinical acumen, EKG, Troponin, and serial changes to determine if it is an Acute Myocardial Infarction or myocardial injury due to an underlying chronic condition.        TSH - Normal   MAGNESIUM - Normal   HCG, SERUM, QUALITATIVE - Normal   ETHANOL    Narrative:     Plasma Ethanol  Clinical Symptoms:    ETOH (%)               Clinical Symptom  .01-.05              No apparent influence  .03-.12              Euphoria, Diminished judgment and attention   .09-.25              Impaired comprehension, Muscle incoordination  .18-.30              Confusion, Staggered gait, Slurred speech  .25-.40              Markedly decreased response to stimuli, unable to stand or                        walk, vomitting, sleep or stupor  .35-.50              Comatose, Anesthesia, Subnormal body temperature       RAINBOW DRAW    Narrative:     The following orders were created for panel order [place] Draw.  Procedure                               Abnormality         Status                     ---------                               -----------         ------                     Gold Top - [facility][[id number]]                                   Final result               Light Blue Top[[id number]]                                   Final result                 Please view results for these tests on the individual orders.   URINALYSIS W/ MICROSCOPIC IF INDICATED (NO CULTURE)   URINE DRUG SCREEN   HIGH SENSITIVITIY TROPONIN T 2HR   CBC AND DIFFERENTIAL    Narrative:     The following orders were created for panel order CBC & Differential.  Procedure                               Abnormality         Status                     ---------                               -----------         ------                     CBC Auto Differential[[id number]]        Abnormal            Final result                 Please view results for these tests on the individual orders.   [facility]   LIGHT BLUE TOP       LOC: Person, Place, Time, and Situation    Telemetry:  Observation Unit    Cardiac Monitoring Ordered: yes    EKG:   ECG 12 Lead Syncope   Preliminary Result   HEART RATE= 62  bpm   RR Interval= 964  ms   WY Interval= 140  ms   P Horizontal Axis= 4  deg   P Front Axis= 46  deg   QRSD Interval= 83  ms   QT Interval= 394  ms   QTcB= 401   ms   QRS Axis= 16  deg   T Wave Axis= 34  deg   - OTHERWISE NORMAL ECG -   Sinus rhythm   Low voltage, precordial leads   No previous ECG available for comparison   Electronically Signed By:    Date and Time of Study: 2023-10-18 21:24:32          Medications Given in the ED:   Medications   sodium chloride 0.9 % flush 10 mL (has no administration in time range)   acetaminophen (TYLENOL) tablet 1,000 mg (has no administration in time range)   sodium chloride 0.9 % bolus 1,000 mL (0 mL Intravenous Stopped 10/18/23 2229)       Imaging results:  CT Head Without Contrast    Result Date: 10/18/2023  Impression: No acute intracranial process identified. Electronically Signed: Danika Cardenas MD  10/18/2023 11:29 PM EDT  Workstation ID: [id number]    XR Chest 1 View    Result Date: 10/18/2023  No acute cardiopulmonary abnormality. Electronically Signed: Tony Braun MD  10/18/2023 10:17 PM EDT  Workstation ID: [id number]     Social issues:   Social History     Socioeconomic History    Marital status: Single       NIH Stroke Scale:  Interval: (not recorded)  1a. Level of Consciousness: (not recorded)  1b. LOC Questions: (not recorded)  1c. LOC Commands: (not recorded)  2. Best Gaze: (not recorded)  3. Visual: (not recorded)  4. Facial Palsy: (not recorded)  5a. Motor Arm, Left: (not recorded)  5b. Motor Arm, Right: (not recorded)  6a. Motor Leg, Left: (not recorded)  6b. Motor Leg, Right: (not recorded)  7. Limb Ataxia: (not recorded)  8. Sensory: (not recorded)  9. Best Language: (not recorded)  10. Dysarthria: (not recorded)  11. Extinction and Inattention (formerly Neglect): (not recorded)    Total (NIH Stroke Scale): (not recorded)     Additional notable assessment information:     Nursing report ED to floor:      Homer Sosa RN   10/19/23 00:13 EDT

## 2023-10-19 NOTE — ED PROVIDER NOTES
Subjective   History of Present Illness  Patient is a 43-year-old female that presents to the ED after a syncopal episode today.  Patient was sitting down with family and said she felt hot and nauseous right before she fainted.  Patient denies syncopal episodes in the past.  Patient lost consciousness for 5 to 10 seconds and possibly hit head on the concrete floor.  She states she has a headache, drowsiness, weakness, blurry vision in the right eye.  Patient states she ate food and was hydrated today.  She had half a beer prior to her syncopal episode.  She denies any cardiac history and denies chest pain or dyspnea.  Patient denies urinary symptoms, abdominal pain, recent travel, exposure to anyone sick, numbness, tingling, tinnitus.        Review of Systems   HENT:  Negative for tinnitus.    Eyes:  Positive for visual disturbance.   Respiratory:  Negative for shortness of breath.    Cardiovascular:  Negative for chest pain.   Gastrointestinal:  Positive for nausea. Negative for abdominal pain, diarrhea and vomiting.   Genitourinary:  Negative for dysuria, frequency and urgency.   Musculoskeletal:  Negative for back pain and neck pain.   Neurological:  Positive for syncope, weakness, light-headedness and headaches. Negative for speech difficulty and numbness.       History reviewed. No pertinent past medical history.    Allergies   Allergen Reactions    Latex Unknown - Low Severity       History reviewed. No pertinent surgical history.    History reviewed. No pertinent family history.    Social History     Socioeconomic History    Marital status: Single           Objective   Physical Exam  Vitals and nursing note reviewed.   Constitutional:       General: She is not in acute distress.     Appearance: She is well-developed. She is ill-appearing. She is not toxic-appearing or diaphoretic.   HENT:      Head: Normocephalic and atraumatic.      Mouth/Throat:      Mouth: Mucous membranes are moist.      Pharynx:  "Oropharynx is clear.   Eyes:      General: No scleral icterus.     Extraocular Movements: Extraocular movements intact.      Pupils: Pupils are equal, round, and reactive to light.   Cardiovascular:      Rate and Rhythm: Normal rate and regular rhythm.      Pulses: Normal pulses.      Heart sounds: No murmur heard.     No friction rub. No gallop.   Pulmonary:      Effort: Pulmonary effort is normal. No respiratory distress.      Breath sounds: Normal breath sounds. No stridor. No wheezing, rhonchi or rales.   Chest:      Chest wall: No tenderness.   Abdominal:      General: Bowel sounds are normal. There is no distension. There are no signs of injury.      Palpations: Abdomen is soft.      Tenderness: There is no abdominal tenderness. There is no right CVA tenderness, left CVA tenderness, guarding or rebound.      Hernia: No hernia is present.   Skin:     General: Skin is warm.      Capillary Refill: Capillary refill takes less than 2 seconds.      Coloration: Skin is not cyanotic, jaundiced or pale.      Findings: No rash.   Neurological:      General: No focal deficit present.      Mental Status: She is alert and oriented to person, place, and time.      GCS: GCS eye subscore is 4. GCS verbal subscore is 5. GCS motor subscore is 6.      Comments: Equal weak  strength bilaterally   Psychiatric:         Mood and Affect: Mood normal.         Behavior: Behavior normal.         Procedures           ED Course    /75   Pulse 61   Temp 98.5 °F (36.9 °C)   Resp 14   Ht 172.7 cm (68\")   Wt 86.2 kg (190 lb)   SpO2 99%   BMI 28.89 kg/m²   Medications   sodium chloride 0.9 % flush 10 mL (has no administration in time range)   acetaminophen (TYLENOL) tablet 1,000 mg (has no administration in time range)   sodium chloride 0.9 % flush 10 mL (has no administration in time range)   sodium chloride 0.9 % flush 10 mL (has no administration in time range)   sodium chloride 0.9 % infusion 40 mL (has no administration " in time range)   acetaminophen (TYLENOL) tablet 650 mg (has no administration in time range)   sennosides-docusate (PERICOLACE) 8.6-50 MG per tablet 2 tablet (has no administration in time range)     And   polyethylene glycol (MIRALAX) packet 17 g (has no administration in time range)     And   bisacodyl (DULCOLAX) EC tablet 5 mg (has no administration in time range)     And   bisacodyl (DULCOLAX) suppository 10 mg (has no administration in time range)   ondansetron (ZOFRAN) injection 4 mg (has no administration in time range)   melatonin tablet 5 mg (has no administration in time range)   sodium chloride 0.9 % bolus 1,000 mL (0 mL Intravenous Stopped 10/18/23 2229)     Labs Reviewed   COMPREHENSIVE METABOLIC PANEL - Abnormal; Notable for the following components:       Result Value    Glucose 108 (*)     All other components within normal limits    Narrative:     GFR Normal >60  Chronic Kidney Disease <60  Kidney Failure <15     CBC WITH AUTO DIFFERENTIAL - Abnormal; Notable for the following components:    WBC 13.50 (*)     Neutrophil % 77.9 (*)     Lymphocyte % 13.8 (*)     Neutrophils, Absolute 10.50 (*)     All other components within normal limits   TROPONIN - Normal    Narrative:     High Sensitive Troponin T Reference Range:  <10.0 ng/L- Negative Female for AMI  <15.0 ng/L- Negative Male for AMI  >=10 - Abnormal Female indicating possible myocardial injury.  >=15 - Abnormal Male indicating possible myocardial injury.   Clinicians would have to utilize clinical acumen, EKG, Troponin, and serial changes to determine if it is an Acute Myocardial Infarction or myocardial injury due to an underlying chronic condition.        TSH - Normal   MAGNESIUM - Normal   HCG, SERUM, QUALITATIVE - Normal   ETHANOL    Narrative:     Plasma Ethanol Clinical Symptoms:    ETOH (%)               Clinical Symptom  .01-.05              No apparent influence  .03-.12              Euphoria, Diminished judgment and attention   .09-.25               Impaired comprehension, Muscle incoordination  .18-.30              Confusion, Staggered gait, Slurred speech  .25-.40              Markedly decreased response to stimuli, unable to stand or                        walk, vomitting, sleep or stupor  .35-.50              Comatose, Anesthesia, Subnormal body temperature       RAINBOW DRAW    Narrative:     The following orders were created for panel order [place] Draw.  Procedure                               Abnormality         Status                     ---------                               -----------         ------                     [facility] - [facility][[id number]]                                   Final result               Light Blue Top[[id number]]                                   Final result                 Please view results for these tests on the individual orders.   HIGH SENSITIVITIY TROPONIN T 2HR    Narrative:     High Sensitive Troponin T Reference Range:  <10.0 ng/L- Negative Female for AMI  <15.0 ng/L- Negative Male for AMI  >=10 - Abnormal Female indicating possible myocardial injury.  >=15 - Abnormal Male indicating possible myocardial injury.   Clinicians would have to utilize clinical acumen, EKG, Troponin, and serial changes to determine if it is an Acute Myocardial Infarction or myocardial injury due to an underlying chronic condition.        URINALYSIS W/ MICROSCOPIC IF INDICATED (NO CULTURE)   URINE DRUG SCREEN   BASIC METABOLIC PANEL   CBC WITH AUTO DIFFERENTIAL   CBC AND DIFFERENTIAL    Narrative:     The following orders were created for panel order CBC & Differential.  Procedure                               Abnormality         Status                     ---------                               -----------         ------                     CBC Auto Differential[[id number]]        Abnormal            Final result                 Please view results for these tests on the individual orders.   [facility] - [facility]   LIGHT BLUE TOP     XR  Spine Cervical Complete 4 or 5 View   Final Result   Impression:   No acute osseous abnormality or significant degenerative change.         Electronically Signed: Danika Cardenas MD     10/19/2023 12:30 AM EDT     Workstation ID: [id number]      CT Head Without Contrast   Final Result   Impression:   No acute intracranial process identified.            Electronically Signed: Danika Cardenas MD     10/18/2023 11:29 PM EDT     Workstation ID: [id number]      XR Chest 1 View   Final Result   No acute cardiopulmonary abnormality.            Electronically Signed: Tony Braun MD     10/18/2023 10:17 PM EDT     Workstation ID: [id number]                                               Medical Decision Making  Chart Review: No pertinent charts to review    Comorbidity: As per past medical history  Differentials: Arrhythmia, electrolyte abnormality, ACS, orthostatic hypotension     ;this list is not all inclusive and does not constitute the entirety of considered causes  EKG: Interpreted by myself and Dr. Betancourt sinus rhythm rate 62 low voltage in precordial leads  Labs: As above  Radiology: My interpretation x-ray shows no acute infiltrate or pneumothorax, CT head shows no obvious brain bleed correlated with radiologist interpretations as below  CT Head Without Contrast   Final Result    Impression:    No acute intracranial process identified.          Electronically Signed: Danika Cardenas MD      10/18/2023 11:29 PM EDT      Workstation ID: [id number]     XR Chest 1 View   Final Result    No acute cardiopulmonary abnormality.        Electronically Signed: Tony Braun MD      10/18/2023 10:17 PM EDT      Workstation ID: [id number]     Disposition/Treatment:  Appropriate PPE was worn during exam and throughout all encounters with the patient.  Patient presented to the ED with reports of syncopal episode.  No seizure-like activity was reported.  No history of seizures.  Patient has no medical problems.  She is alert and oriented  generally looks somewhat ill-appearing and weak.  She has no acute cranial focal neural deficits on exam.  EKG showed no acute STEMI.  Orthostatics unremarkable for orthostatic hypotension.  She was given fluids while in the ED upon reassessment is now sitting up resting comfortably does look somewhat better seems to be more alert.  Initially was complaining of no pain now reports somewhat of a headache and neck pain.  X-ray cervical spine was added on and showed no acute osseous abnormalities.    CBC shows white count 13.5 no signs of anemia.  Metabolic panel shows glucose 108 otherwise normal.  TSH and magnesium normal.  Initial troponin less than 6.  Ethanol unremarkable.  hCG negative.  Urinalysis and urine drug currently pending.  Patient's syncopal episode will be admitted for cardiac work-up.  Case is also discussed with attending Dr Betancourt will also have neurology and cardiology see her in the morning.  Cardiogram was also ordered for the morning.  I see no focal deficits on exam today.  Findings were discussed with the patient and family at bedside who are in agreement with this plan.  All questions were answered.    Problems Addressed:  Syncope, unspecified syncope type: complicated acute illness or injury    Amount and/or Complexity of Data Reviewed  Labs: ordered. Decision-making details documented in ED Course.  Radiology: ordered. Decision-making details documented in ED Course.  ECG/medicine tests: ordered.    Risk  OTC drugs.  Prescription drug management.  Decision regarding hospitalization.        Final diagnoses:   Syncope, unspecified syncope type       ED Disposition  ED Disposition       ED Disposition   Decision to Admit    Condition   --    Comment   --               No follow-up provider specified.       Medication List      No changes were made to your prescriptions during this visit.            Salvador Abrams PA  10/19/23 0040

## 2023-10-19 NOTE — CONSULTS
Referring Provider: Barron Betancourt DO  Reason for Consultation:  Syncope    Patient Care Team:  Provider, No Known as PCP - General    Chief complaint  Syncope    Subjective .     History of present illness:  Dawn Jarrell is a 43 y.o. female who presents with history of syncopal episode.  Patient apparently was sitting down with family and she felt hot and nauseous and had fainted.  Denies having any episodes in the past.  May be she lost consciousness for 5 to 10 seconds.  Apparently hit her head on the concrete floor.  Denies having any chest discomfort or heaviness or tightness in the chest.  Denies having any shortness of breath.  No other associated aggravating or elevating factors.  Cardiology consultation was requested.        ROS      The patient has been without any chest discomfort, shortness of breath, palpitations, dizziness denies having any headache, abdominal pain, nausea, vomiting, diarrhea, constipation, loss of weight or loss of appetite.  Denies having any excessive bruising, hematuria or blood in the stool.    Review of all systems negative except as indicated      History  History reviewed. No pertinent past medical history.    Past Surgical History:   Procedure Laterality Date    HYSTERECTOMY      HYSTEROSCOPY ENDOMETRIAL ABLATION Bilateral        Family History   Problem Relation Age of Onset    Stroke Mother     Colon cancer Father        Social History     Tobacco Use    Smoking status: Every Day     Packs/day: .5     Types: Cigarettes    Smokeless tobacco: Never   Vaping Use    Vaping Use: Never used   Substance Use Topics    Alcohol use: Yes     Alcohol/week: 1.0 standard drink of alcohol     Types: 1 Cans of beer per week     Comment: social drinker    Drug use: Never        Medications Prior to Admission   Medication Sig Dispense Refill Last Dose    aspirin 81 MG EC tablet Take 1 tablet by mouth Daily.   10/18/2023    escitalopram (LEXAPRO) 20 MG tablet Take 1 tablet by  "mouth Daily.   10/18/2023         Latex    Scheduled Meds:senna-docusate sodium, 2 tablet, Oral, BID  sodium chloride, 10 mL, Intravenous, Q12H      Continuous Infusions:   PRN Meds:.  acetaminophen    senna-docusate sodium **AND** polyethylene glycol **AND** bisacodyl **AND** bisacodyl    melatonin    ondansetron    [COMPLETED] Insert Peripheral IV **AND** sodium chloride    sodium chloride    sodium chloride    Objective     VITAL SIGNS  Vitals:    10/18/23 2346 10/19/23 0040 10/19/23 0453 10/19/23 0555   BP: 129/75 125/82 125/82 108/70   BP Location:  Right arm Right arm Right arm   Patient Position:  Lying Lying Lying   Pulse: 61 58 58 70   Resp:  19 19 17   Temp:   98.3 °F (36.8 °C) 98.6 °F (37 °C)   TempSrc:   Oral Oral   SpO2: 99% 98% 98% 97%   Weight:       Height:   172.7 cm (67.99\")        Flowsheet Rows      Flowsheet Row First Filed Value   Admission Height 172.7 cm (68\") Documented at 10/18/2023 2105   Admission Weight 86.2 kg (190 lb) Documented at 10/18/2023 2105            No intake or output data in the 24 hours ending 10/19/23 0630     TELEMETRY: Sinus rhythm    Physical Exam:  The patient is alert, oriented and in no distress.  Vital signs as noted above.  Head and neck revealed no carotid bruits or jugular venous distention.  No thyromegaly or lymphadenopathy is present  Lungs clear.  No wheezing.  Breath sounds are normal bilaterally.  Heart normal first and second heart sounds. No murmur.  No precordial rub is present.  No gallop is present.  Abdomen soft and nontender.  No organomegaly is present.  Extremities with good peripheral pulses without any pedal edema.  Skin warm and dry.  Musculoskeletal system is grossly normal  CNS grossly normal    Reviewed and updated.    Results Review:   I reviewed the patient's new clinical results.  Lab Results (last 24 hours)       Procedure Component Value Units Date/Time    Basic Metabolic Panel [[id number]]  (Abnormal) Collected: 10/19/23 0357    " Specimen: Blood from Arm, Right Updated: 10/19/23 0446     Glucose 133 mg/dL      BUN 11 mg/dL      Creatinine 0.75 mg/dL      Sodium 139 mmol/L      Potassium 3.8 mmol/L      Comment: Slight hemolysis detected by analyzer. Results may be affected.        Chloride 106 mmol/L      CO2 23.0 mmol/L      Calcium 8.7 mg/dL      BUN/Creatinine Ratio 14.7     Anion Gap 10.0 mmol/L      eGFR 100.2 mL/min/1.73     Narrative:      GFR Normal >60  Chronic Kidney Disease <60  Kidney Failure <15      CBC Auto Differential [[id number]]  (Abnormal) Collected: 10/19/23 0357    Specimen: Blood from Arm, Right Updated: 10/19/23 0417     WBC 11.40 10*3/mm3      RBC 4.00 10*6/mm3      Hemoglobin 12.7 g/dL      Hematocrit 37.8 %      MCV 94.4 fL      MCH 31.7 pg      MCHC 33.5 g/dL      RDW 13.4 %      RDW-SD 46.4 fl      MPV 8.0 fL      Platelets 333 10*3/mm3      Neutrophil % 71.9 %      Lymphocyte % 20.9 %      Monocyte % 6.3 %      Eosinophil % 0.4 %      Basophil % 0.5 %      Neutrophils, Absolute 8.20 10*3/mm3      Lymphocytes, Absolute 2.40 10*3/mm3      Monocytes, Absolute 0.70 10*3/mm3      Eosinophils, Absolute 0.00 10*3/mm3      Basophils, Absolute 0.10 10*3/mm3      nRBC 0.0 /100 WBC     High Sensitivity Troponin T 2Hr [[id number]] Collected: 10/18/23 2357    Specimen: Blood Updated: 10/19/23 0032     HS Troponin T <6 ng/L      Troponin T Delta --     Comment: Unable to calculate.       Narrative:      High Sensitive Troponin T Reference Range:  <10.0 ng/L- Negative Female for AMI  <15.0 ng/L- Negative Male for AMI  >=10 - Abnormal Female indicating possible myocardial injury.  >=15 - Abnormal Male indicating possible myocardial injury.   Clinicians would have to utilize clinical acumen, EKG, Troponin, and serial changes to determine if it is an Acute Myocardial Infarction or myocardial injury due to an underlying chronic condition.         hCG, Serum, Qualitative [[id number]]  (Normal) Collected: 10/18/23 2145    Specimen:  Blood Updated: 10/18/23 2348     HCG Qualitative Negative    [place] Draw [[id number]] Collected: 10/18/23 2145    Specimen: Blood Updated: 10/18/23 2247    Narrative:      The following orders were created for panel order [place] Draw.  Procedure                               Abnormality         Status                     ---------                               -----------         ------                     Gold Top - SST[[id number]]                                   Final result               Light Blue Top[[id number]]                                   Final result                 Please view results for these tests on the individual orders.    Gold Top - SST [[id number]] Collected: 10/18/23 2145    Specimen: Blood Updated: 10/18/23 2247     Extra Tube Hold for add-ons.     Comment: Auto resulted.       Light Blue Top [[id number]] Collected: 10/18/23 2145    Specimen: Blood Updated: 10/18/23 2247     Extra Tube Hold for add-ons.     Comment: Auto resulted       High Sensitivity Troponin T [[id number]]  (Normal) Collected: 10/18/23 2145    Specimen: Blood Updated: 10/18/23 2227     HS Troponin T <6 ng/L     Narrative:      High Sensitive Troponin T Reference Range:  <10.0 ng/L- Negative Female for AMI  <15.0 ng/L- Negative Male for AMI  >=10 - Abnormal Female indicating possible myocardial injury.  >=15 - Abnormal Male indicating possible myocardial injury.   Clinicians would have to utilize clinical acumen, EKG, Troponin, and serial changes to determine if it is an Acute Myocardial Infarction or myocardial injury due to an underlying chronic condition.         TSH [[id number]]  (Normal) Collected: 10/18/23 2145    Specimen: Blood Updated: 10/18/23 2227     TSH 1.940 uIU/mL     Comprehensive Metabolic Panel [[id number]]  (Abnormal) Collected: 10/18/23 2145    Specimen: Blood Updated: 10/18/23 2220     Glucose 108 mg/dL      BUN 13 mg/dL      Creatinine 0.88 mg/dL      Sodium 136 mmol/L      Potassium 3.9 mmol/L       Chloride 102 mmol/L      CO2 22.0 mmol/L      Calcium 8.9 mg/dL      Total Protein 7.0 g/dL      Albumin 4.0 g/dL      ALT (SGPT) 14 U/L      AST (SGOT) 14 U/L      Alkaline Phosphatase 67 U/L      Total Bilirubin 0.2 mg/dL      Globulin 3.0 gm/dL      A/G Ratio 1.3 g/dL      BUN/Creatinine Ratio 14.8     Anion Gap 12.0 mmol/L      eGFR 82.7 mL/min/1.73     Narrative:      GFR Normal >60  Chronic Kidney Disease <60  Kidney Failure <15      Ethanol [[id number]] Collected: 10/18/23 2145    Specimen: Blood Updated: 10/18/23 2220     Ethanol % <0.010 %     Narrative:      Plasma Ethanol Clinical Symptoms:    ETOH (%)               Clinical Symptom  .01-.05              No apparent influence  .03-.12              Euphoria, Diminished judgment and attention   .09-.25              Impaired comprehension, Muscle incoordination  .18-.30              Confusion, Staggered gait, Slurred speech  .25-.40              Markedly decreased response to stimuli, unable to stand or                        walk, vomitting, sleep or stupor  .35-.50              Comatose, Anesthesia, Subnormal body temperature        Magnesium [[id number]]  (Normal) Collected: 10/18/23 2145    Specimen: Blood Updated: 10/18/23 2220     Magnesium 2.0 mg/dL     CBC & Differential [[id number]]  (Abnormal) Collected: 10/18/23 2145    Specimen: Blood Updated: 10/18/23 2202    Narrative:      The following orders were created for panel order CBC & Differential.  Procedure                               Abnormality         Status                     ---------                               -----------         ------                     CBC Auto Differential[[id number]]        Abnormal            Final result                 Please view results for these tests on the individual orders.    CBC Auto Differential [[id number]]  (Abnormal) Collected: 10/18/23 2145    Specimen: Blood Updated: 10/18/23 2202     WBC 13.50 10*3/mm3      RBC 4.15 10*6/mm3      Hemoglobin 13.2 g/dL       Hematocrit 39.3 %      MCV 94.7 fL      MCH 31.9 pg      MCHC 33.7 g/dL      RDW 13.3 %      RDW-SD 45.9 fl      MPV 8.1 fL      Platelets 368 10*3/mm3      Neutrophil % 77.9 %      Lymphocyte % 13.8 %      Monocyte % 7.0 %      Eosinophil % 0.6 %      Basophil % 0.7 %      Neutrophils, Absolute 10.50 10*3/mm3      Lymphocytes, Absolute 1.90 10*3/mm3      Monocytes, Absolute 0.90 10*3/mm3      Eosinophils, Absolute 0.10 10*3/mm3      Basophils, Absolute 0.10 10*3/mm3      nRBC 0.0 /100 WBC             Imaging Results (Last 24 Hours)       Procedure Component Value Units Date/Time    XR Spine Cervical Complete 4 or 5 View [[id number]] Collected: 10/19/23 0029     Updated: 10/19/23 0032    Narrative:      XR SPINE CERVICAL COMPLETE 4 OR 5 VW    Date of Exam: 10/19/2023 12:11 AM EDT    Indication: neck pain    Comparison: None available.    Findings:  There are 7 cervical type vertebral bodies in anatomic alignment. No evidence of fracture or compression deformity.  No significant spondylolisthesis. No significant degenerative changes are present throughout the spine. No evidence of significant   neuroforaminal stenosis. The prevertebral soft tissues appear within normal limits.      Impression:      Impression:  No acute osseous abnormality or significant degenerative change.      Electronically Signed: Danika Cardenas MD    10/19/2023 12:30 AM EDT    Workstation ID: [id number]    CT Head Without Contrast [[id number]] Collected: 10/18/23 2328     Updated: 10/18/23 2331    Narrative:      CT HEAD WO CONTRAST    Date of Exam: 10/18/2023 11:15 PM EDT    Indication: syncope and collapse.    Comparison: None available.    Technique: Axial CT images were obtained of the head without contrast administration.  Coronal reconstructions were performed.  Automated exposure control and iterative reconstruction methods were used.      Findings:  There is no evidence of hemorrhage. There is no mass effect or midline shift.    There is  no extracerebral collection.    Ventricles are normal in size and configuration for patient's stated age.      Posterior fossa is within normal limits.    Calvarium and skull base appear intact.   Visualized sinuses show no air fluid levels. Visualized orbits are unremarkable.      Impression:      Impression:  No acute intracranial process identified.        Electronically Signed: Danika Cardenas MD    10/18/2023 11:29 PM EDT    Workstation ID: [id number]    XR Chest 1 View [[id number]] Collected: 10/18/23 2217     Updated: 10/18/23 2219    Narrative:      XR CHEST 1 VW    Date of Exam: 10/18/2023 9:41 PM EDT    Indication: syncope    Comparison: None available.    Findings:  Lungs are clear without focal consolidation, overt edema, large effusion or pneumothorax. Heart size within normal limits. Osseous structures are grossly unremarkable.        Impression:      No acute cardiopulmonary abnormality.        Electronically Signed: Tony Braun MD    10/18/2023 10:17 PM EDT    Workstation ID: [id number]        LAB RESULTS (LAST 7 DAYS)    CBC  Results from last 7 days   Lab Units 10/19/23  0357 10/18/23  2145   WBC 10*3/mm3 11.40* 13.50*   RBC 10*6/mm3 4.00 4.15   HEMOGLOBIN g/dL 12.7 13.2   HEMATOCRIT % 37.8 39.3   MCV fL 94.4 94.7   PLATELETS 10*3/mm3 333 368       BMP  Results from last 7 days   Lab Units 10/19/23  0357 10/18/23  2145   SODIUM mmol/L 139 136   POTASSIUM mmol/L 3.8 3.9   CHLORIDE mmol/L 106 102   CO2 mmol/L 23.0 22.0   BUN mg/dL 11 13   CREATININE mg/dL 0.75 0.88   GLUCOSE mg/dL 133* 108*   MAGNESIUM mg/dL  --  2.0       CMP   Results from last 7 days   Lab Units 10/19/23  0357 10/18/23  2145   SODIUM mmol/L 139 136   POTASSIUM mmol/L 3.8 3.9   CHLORIDE mmol/L 106 102   CO2 mmol/L 23.0 22.0   BUN mg/dL 11 13   CREATININE mg/dL 0.75 0.88   GLUCOSE mg/dL 133* 108*   ALBUMIN g/dL  --  4.0   BILIRUBIN mg/dL  --  0.2   ALK PHOS U/L  --  67   AST (SGOT) U/L  --  14   ALT (SGPT) U/L  --  14         BNP         TROPONIN  Results from last 7 days   Lab Units 10/18/23  2357   HSTROP T ng/L <6       CoAg        Creatinine Clearance  Estimated Creatinine Clearance: 108.9 mL/min (by C-G formula based on SCr of 0.75 mg/dL).    ABG        Radiology  XR Spine Cervical Complete 4 or 5 View    Result Date: 10/19/2023  Impression: No acute osseous abnormality or significant degenerative change. Electronically Signed: Danika Cardenas MD  10/19/2023 12:30 AM EDT  Workstation ID: [id number]    CT Head Without Contrast    Result Date: 10/18/2023  Impression: No acute intracranial process identified. Electronically Signed: Danika Cardenas MD  10/18/2023 11:29 PM EDT  Workstation ID: [id number]    XR Chest 1 View    Result Date: 10/18/2023  No acute cardiopulmonary abnormality. Electronically Signed: Tony Braun MD  10/18/2023 10:17 PM EDT  Workstation ID: [id number]       EKG            I personally viewed and interpreted the patient's EKG/Telemetry data: Sinus rhythm    ECHOCARDIOGRAM:        STRESS TEST        Cardiolite (Tc-99m sestamibi) stress test    HEART CATHETERIZATION  No results found for this or any previous visit.      OTHER:     Assessment & Plan     Principal Problem:    Syncope    ]]]]]]]]]]]]]]]]]]]]]]]  Impression  ===========  -Syncopal episode x1.  Associated nausea and being hot.  Cannot exclude vasovagal episode.  EKG showed no acute changes.  Troponin levels are negative.  CT scan of the head was negative.    Status post hysterectomy    - Smoker    - Allergic to latex  ===========  Plan  =========  Syncope  Stress Cardiolite test  Echocardiogram  Close cardiac monitoring.    Consider 30-day event monitor at the time of discharge if no definite etiology for recent syncopal episode was found.    Further plan will depend on patient's progress.  ]]]]]]]]]]]]]]]]]]           Robbie Mei MD  10/19/23  06:30 EDT

## 2023-10-19 NOTE — SIGNIFICANT NOTE
Attempted to see pt for PT eval this date.  Per RN pt has been off the floor since 10:00 and continues to be off the floor for testing.  Pt has plans for additional testing later this afternoon

## 2023-10-19 NOTE — PLAN OF CARE
Goal Outcome Evaluation:            Patient transferred to observation unit at this time.

## 2023-10-19 NOTE — CASE MANAGEMENT/SOCIAL WORK
Continued Stay Note  [facility]     Patient Name: Dawn Jarrell  MRN: [id number]  Today's Date: 10/19/2023    Admit Date: 10/18/2023    Plan: Needs CM interview. Expected return home   Discharge Plan       Row Name 10/19/23 1310       Plan    Plan Needs CM interview. Expected return home    Plan Comments Barriers: CM attempted several times but off floor for ordered testing: Pending Stress test, Echo, MRI Brain, Carotid US. PT eval pending. CM will need to interview if appropriate                        Phone communication or documentation only - no physical contact with patient or family.   Yaneli LEOSN,RN Case Manager  [facility]  Phone: Desk- [phone] cell- [phone]

## 2023-10-19 NOTE — DISCHARGE INSTR - APPOINTMENTS
Call [facility]  Bonnie Jane, Nurse Practitioner  [address] [place] [id number] [phone]

## 2023-10-19 NOTE — PLAN OF CARE
Goal Outcome Evaluation:  Plan of Care Reviewed With: patient           Outcome Evaluation: Pt is a 44 y/o female who became hot and nauseated prior to syncopal episode.  Pt with (+) LOC and possible report of hitting head on concrete.  Pt with c/o headache, drowsiness, weakness and blurry R eye vision.  CT head: (-) acute, MRI brain: (-) acute.  C-spine X-ray: (-) acute.  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  Pt reports she lives with her children in a 2 story home with 4 steps to enter into home.  Pt's bedroom is on the second floor.  Prior to hospitalization pt was independent with all functional mobility tasks and did not utlize an assitive device.  Pt on room air laying supine in bed upon entry into room.  Pt reports 3/10 headache but normal vision and no dizziness.  Pt was independent for bed mobility, transfers and ambulated 500' without an assistive device independently.  Pt navigated a flight of stair with mod I.  Pt is at prior level of function and has no current skilled PT needs.  Will d/c from inpt PT services.      Anticipated Discharge Disposition (PT): home

## 2023-10-19 NOTE — THERAPY EVALUATION
Patient Name: Dawn Jarrell  : 1980    MRN: [id number]                              Today's Date: 10/19/2023       Admit Date: 10/18/2023    Visit Dx:     ICD-10-CM ICD-9-CM   1. Syncope, unspecified syncope type  R55 780.2     Patient Active Problem List   Diagnosis    Syncope     History reviewed. No pertinent past medical history.  Past Surgical History:   Procedure Laterality Date    HYSTERECTOMY      HYSTEROSCOPY ENDOMETRIAL ABLATION Bilateral       General Information       Row Name 10/19/23 [facility]          Physical Therapy Time and Intention    Document Type evaluation  -AM     Mode of Treatment physical therapy  -AM       Row Name 10/19/23 [facility]          General Information    Patient Profile Reviewed yes  -AM     Prior Level of Function independent:;community mobility;gait;transfer;bed mobility;using stairs  -AM     Existing Precautions/Restrictions no known precautions/restrictions  -AM     Barriers to Rehab none identified  -AM       Row Name 10/19/23 1356          Living Environment    People in Home child(gonsalo), adult;child(gonsalo), dependent  -AM       Row Name 10/19/23 [facility]          Stairs Within Home, Primary    Number of Stairs, Within Home, Primary --  flight of steps  -AM       Row Name 10/19/23 1356          Cognition    Orientation Status (Cognition) oriented x 4  -AM       Row Name 10/19/23 1352          Safety Issues, Functional Mobility    Impairments Affecting Function (Mobility) pain  -AM               User Key  (r) = Recorded By, (t) = Taken By, (c) = Cosigned By      Initials Name Provider Type    AM Edouard Palencia, PT Physical Therapist                   Mobility       Row Name 10/19/23 1358          Bed Mobility    Bed Mobility bed mobility (all) activities  -AM     All Activities, [place] (Bed Mobility) independent  -AM       Row Name 10/19/23 1358          Sit-Stand Transfer    Sit-Stand [place] (Transfers) independent  -AM       Row Name 10/19/23 1350           Gait/Stairs (Locomotion)    [place] Level (Gait) independent  -AM     Distance in Feet (Gait) 500'  -AM     [place] Level (Stairs) independent  -AM     Number of Steps (Stairs) flight  -AM     Ascending Technique (Stairs) step-over-step  -AM               User Key  (r) = Recorded By, (t) = Taken By, (c) = Cosigned By      Initials Name Provider Type    AM Edouard Palencia, PT Physical Therapist                   Obj/Interventions       Row Name 10/19/23 1358          Range of Motion Comprehensive    General Range of Motion no range of motion deficits identified  -AM       [facility] Name 10/19/23 1358          Strength Comprehensive (MMT)    General Manual Muscle Testing (MMT) Assessment no strength deficits identified  -AM       [facility] Name 10/19/23 1358          Balance    Balance Assessment sitting static balance;sitting dynamic balance;sit to stand dynamic balance;standing static balance;standing dynamic balance  -AM     Static Sitting Balance independent  -AM     Dynamic Sitting Balance independent  -AM     Position, Sitting Balance unsupported;sitting edge of bed  -AM     Sit to Stand Dynamic Balance independent  -AM     Dynamic Standing Balance independent  -AM     Position/Device Used, Standing Balance unsupported  -AM       Row Name 10/19/23 1358          Sensory Assessment (Somatosensory)    Sensory Assessment (Somatosensory) sensation intact  -AM               User Key  (r) = Recorded By, (t) = Taken By, (c) = Cosigned By      Initials Name Provider Type    AM Edouard Palencia, [place] Physical Therapist                   Goals/Plan    No documentation.                  Clinical Impression       [facility] Name 10/19/23 1358          Pain    Pretreatment Pain Rating 3/10  -AM     Posttreatment Pain Rating 3/10  -AM     Pain Location - --  headache  -AM     Pre/Posttreatment Pain Comment Pt reporting nsg gave Tylenol before PT session  -AM     Pain Intervention(s) Repositioned;Emotional support  -AM       Row Name  10/19/23 1356          Plan of Care Review    Plan of Care Reviewed With patient  -AM     Outcome Evaluation Pt is a 42 y/o female who became hot and nauseated prior to syncopal episode.  Pt with (+) LOC and possible report of hitting head on concrete.  Pt with c/o headache, drowsiness, weakness and blurry R eye vision.  CT head: (-) acute, MRI brain: (-) acute.  C-spine X-ray: (-) acute.  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  Pt reports she lives with her children in a 2 story home with 4 steps to enter into home.  Pt's bedroom is on the second floor.  Prior to hospitalization pt was independent with all functional mobility tasks and did not utlize an assitive device.  Pt on room air laying supine in bed upon entry into room.  Pt reports 3/10 headache but normal vision and no dizziness.  Pt was independent for bed mobility, transfers and ambulated 500' without an assistive device independently.  Pt navigated a flight of stair with mod I.  Pt is at prior level of function and has no current skilled PT needs.  Will d/c from inpt PT services.  -AM       Row Name 10/19/23 0758          Therapy Assessment/Plan (PT)    Patient/Family Therapy Goals Statement (PT) To go home  -AM     Criteria for Skilled Interventions Met (PT) no problems identified which require skilled intervention  -AM     Therapy Frequency (PT) evaluation only  -AM       Row Name 10/19/23 1356          Vital Signs    O2 Delivery Pre Treatment room air  -AM     O2 Delivery Intra Treatment room air  -AM     O2 Delivery Post Treatment room air  -AM     Pre Patient Position Supine  -AM     Intra Patient Position Standing  -AM     Post Patient Position Supine  -AM       Row Name 10/19/23 1357          Positioning and Restraints    Pre-Treatment Position in bed  -AM     Post Treatment Position bed  -AM     In Bed supine;call light within reach;encouraged to call for assist;with family/caregiver  -AM               User Key  (r) = Recorded By, (t) =  Taken By, (c) = Cosigned By      Initials Name Provider Type    AM Edouard Palencia, PT Physical Therapist                   Outcome Measures       Row Name 10/19/23 1405 10/19/23 0800       How much help from another person do you currently need...    Turning from your back to your side while in flat bed without using bedrails? 4  -AM 4  -AT    Moving from lying on back to sitting on the side of a flat bed without bedrails? 4  -AM 4  -AT    Moving to and from a bed to a chair (including a wheelchair)? 4  -AM 4  -AT    Standing up from a chair using your arms (e.g., wheelchair, bedside chair)? 4  -AM 4  -AT    Climbing 3-5 steps with a railing? 4  -AM 4  -AT    To walk in hospital room? 4  -AM 4  -AT    AM-PAC 6 Clicks Score (PT) 24  -AM 24  -AT    Highest level of mobility 8 --> Walked 250 feet or more  -AM 8 --> Walked 250 feet or more  -AT              User Key  (r) = Recorded By, (t) = Taken By, (c) = Cosigned By      Initials Name Provider Type    AM Edouard Palencia PT Physical Therapist    AT Edouard Jones, RN Registered Nurse                                 Physical Therapy Education       Title: PT OT SLP Therapies (Done)       Topic: Physical Therapy (Done)       Point: Mobility training (Done)       Learning Progress Summary             Patient Acceptance, E,TB, VU by AM at 10/19/2023 1405                         Point: Body mechanics (Done)       Learning Progress Summary             Patient Acceptance, E,TB, VU by AM at 10/19/2023 1405                         Point: Precautions (Done)       Learning Progress Summary             Patient Acceptance, E,TB, VU by AM at 10/19/2023 1405                                         User Key       Initials Effective Dates Name Provider Type Discipline    AM 05/10/21 -  Edouard Palencia, PT Physical Therapist PT                  PT Recommendation and Plan     Plan of Care Reviewed With: patient  Outcome Evaluation: Pt is a 44 y/o female who became hot and nauseated prior  to syncopal episode.  Pt with (+) LOC and possible report of hitting head on concrete.  Pt with c/o headache, drowsiness, weakness and blurry R eye vision.  CT head: (-) acute, MRI brain: (-) acute.  C-spine X-ray: (-) acute.  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  Pt reports she lives with her children in a 2 story home with 4 steps to enter into home.  Pt's bedroom is on the second floor.  Prior to hospitalization pt was independent with all functional mobility tasks and did not utlize an assitive device.  Pt on room air laying supine in bed upon entry into room.  Pt reports 3/10 headache but normal vision and no dizziness.  Pt was independent for bed mobility, transfers and ambulated 500' without an assistive device independently.  Pt navigated a flight of stair with mod I.  Pt is at prior level of function and has no current skilled PT needs.  Will d/c from inpt PT services.     Time Calculation:         PT Charges       Row Name 10/19/23 1406 10/19/23 1301          Time Calculation    Start Time 1339  -AM --     Stop Time 1353  -AM --     Time Calculation (min) 14 min  -AM --     PT Received On 10/19/23  -AM --     PT - Next Appointment -- 10/20/23  -AM               User Key  (r) = Recorded By, (t) = Taken By, (c) = Cosigned By      Initials Name Provider Type    AM Edouard Palencia, PT Physical Therapist                  Therapy Charges for Today       Code Description Service Date Service Provider Modifiers Qty    [id number]  PT EVAL LOW COMPLEXITY 3 10/19/2023 Edouard Palencia, PT GP 1            PT G-Codes  AM-PAC 6 Clicks Score (PT): 24  PT Discharge Summary  Anticipated Discharge Disposition (PT): home    Edouard Palencia PT  10/19/2023

## 2023-10-20 ENCOUNTER — TELEPHONE (OUTPATIENT)
Dept: CARDIOLOGY | Facility: CLINIC | Age: 43
End: 2023-10-20
Payer: COMMERCIAL

## 2023-10-20 NOTE — TELEPHONE ENCOUNTER
New pt referral has been placed for syncope. Pt was just seen at [facility] ED. Next opening for new pt's for Dr Mei without going over the total number of pt's is 11/6. Is this ok?

## 2023-10-20 NOTE — CASE MANAGEMENT/SOCIAL WORK
Case Management Discharge Note      Final Note: Routine home             Transportation Services  Private: Car    Final Discharge Disposition Code: 01 - home or self-care

## 2023-10-20 NOTE — TELEPHONE ENCOUNTER
Hub staff attempted to follow warm transfer process and was unsuccessful     Caller: Dawn Jarrell    Relationship to patient: Self    Best call back number: [phone]    Patient is needing: RETURNING CALL FOR REFERRAL. PLEASE CALL BACK THANK YOU . ALSO THE TAPE THAT PATIENT RECEIVED FOR HOLTER IS ITCHY. CAN SHE GET A DIFFERENT ALTERNATIVE?

## 2023-10-20 NOTE — TELEPHONE ENCOUNTER
I SPOKE WITH PT TO SCHEDULE NEW PT APPOINTMENT WITH DR ANDERSON ON 11/1/23. PT ALSO ADVISE TO CONTACT [facility] REGARDING THE TAPE FOR HER MONITOR. PT VOICED UNDERSTANDING.

## 2023-10-20 NOTE — DISCHARGE SUMMARY
[facility]    Provider, No Known    CHIEF COMPLAINT:     syncope    HISTORY OF PRESENT ILLNESS:    HPI    43-year-old female that presents to the ED after a syncopal episode today.  Patient was sitting down with family and said she felt hot and nauseous right before she fainted.  Patient denies syncopal episodes in the past.  Patient lost consciousness for 5 to 10 seconds and possibly hit head on the concrete floor.  She states she has a headache, drowsiness, weakness, blurry vision in the right eye.  Patient states she ate food and was hydrated today.  She had half a beer prior to her syncopal episode.  She denies any cardiac history and denies chest pain or dyspnea.  Patient denies urinary symptoms, abdominal pain, recent travel, exposure to anyone sick, numbness, tingling, tinnitus.      Observation 10/19/23  Cardiology and neurology consulted. Pt consulted. MRI brain, us carotid, echo and ua are pending    History reviewed. No pertinent past medical history.  Past Surgical History:   Procedure Laterality Date    HYSTERECTOMY      HYSTEROSCOPY ENDOMETRIAL ABLATION Bilateral      Family History   Problem Relation Age of Onset    Stroke Mother     Colon cancer Father      Social History     Tobacco Use    Smoking status: Every Day     Packs/day: .5     Types: Cigarettes    Smokeless tobacco: Never   Vaping Use    Vaping Use: Never used   Substance Use Topics    Alcohol use: Yes     Alcohol/week: 1.0 standard drink of alcohol     Types: 1 Cans of beer per week     Comment: social drinker    Drug use: Never     No medications prior to admission.     Allergies:  Latex      There is no immunization history on file for this patient.        REVIEW OF SYSTEMS:    ROS  Cardiovascular:  Positive for syncope.   Gastrointestinal:  Positive for nausea.   Neurological:  Positive for headaches.     Vital Signs  Temp:  [98.4 °F (36.9 °C)] 98.4 °F (36.9 °C)  Heart Rate:  [62] 62  Resp:  [18] 18  BP:  (108-120)/(65-70) 120/65          Physical Exam:  Physical Exam  Constitutional:       Appearance: Normal appearance.   HENT:      Mouth/Throat:      Mouth: Mucous membranes are moist.   Cardiovascular:      Rate and Rhythm: Normal rate and regular rhythm.      Pulses: Normal pulses.      Heart sounds: Normal heart sounds.   Pulmonary:      Effort: Pulmonary effort is normal.      Breath sounds: Normal breath sounds.   Abdominal:      Palpations: Abdomen is soft.   Skin:     General: Skin is warm and dry.   Neurological:      General: No focal deficit present.      Mental Status: She is alert and oriented to person, place, and time.   Psychiatric:         Mood and Affect: Mood normal.         Behavior: Behavior normal.     Emotional Behavior:    wnl   Debilities:   None    Results Review:    I reviewed the patient's new clinical results.  Lab Results (most recent)       Procedure Component Value Units Date/Time    Vitamin B12 [[id number]]  (Normal) Collected: 10/18/23 2145    Specimen: Blood Updated: 10/19/23 1722     Vitamin B-12 318 pg/mL     Narrative:      Results may be falsely increased if patient taking Biotin.      Folate [[id number]]  (Normal) Collected: 10/18/23 2145    Specimen: Blood Updated: 10/19/23 1722     Folate 8.85 ng/mL     Narrative:      Results may be falsely increased if patient taking Biotin.      Urine Drug Screen - Urine, Clean Catch [[id number]]  (Abnormal) Collected: 10/19/23 1034    Specimen: Urine, Clean Catch Updated: 10/19/23 1153     Amphet/Methamphet, Screen Negative     Barbiturates Screen, Urine Negative     Benzodiazepine Screen, Urine Negative     Cocaine Screen, Urine Negative     Opiate Screen Negative     THC, Screen, Urine Positive     Methadone Screen, Urine Negative     Oxycodone Screen, Urine Negative    Narrative:      Negative Thresholds Per Drugs Screened:    Amphetamines                 500 ng/ml  Barbiturates                 200 ng/ml  Benzodiazepines              100  ng/ml  Cocaine                      300 ng/ml  Methadone                    300 ng/ml  Opiates                      300 ng/ml  Oxycodone                    100 ng/ml  THC                           50 ng/ml    The Normal Value for all drugs tested is negative. This report includes final unconfirmed screening results to be used for medical treatment purposes only. Unconfirmed results must not be used for non-medical purposes such as employment or legal testing. Clinical consideration should be applied to any drug of abuse test, particularly when unconfirmed results are used.          All urine drugs of abuse requests without chain of custody are for medical screening purposes only.  False positives are possible.      Urinalysis, Microscopic Only - Urine, Clean Catch [[id number]]  (Abnormal) Collected: 10/19/23 1034    Specimen: Urine, Clean Catch Updated: 10/19/23 1149     RBC, UA None Seen /HPF      WBC, UA None Seen /HPF      Bacteria, UA 3+ /HPF      Squamous Epithelial Cells, UA 0-2 /HPF      Hyaline Casts, UA None Seen /LPF      Methodology Manual Light Microscopy    Urinalysis With Microscopic If Indicated (No Culture) - Urine, Clean Catch [[id number]]  (Abnormal) Collected: 10/19/23 1034    Specimen: Urine, Clean Catch Updated: 10/19/23 1127     Color, UA Yellow     Appearance, UA Clear     pH, UA 7.5     Specific Gravity, UA 1.011     Glucose, UA Negative     Ketones, UA Negative     Bilirubin, UA Negative     Blood, UA Negative     Protein, UA Negative     Leuk Esterase, UA Negative     Nitrite, UA Positive     Urobilinogen, UA 0.2 E.U./dL    Hemoglobin A1c [[id number]]  (Abnormal) Collected: 10/19/23 0357    Specimen: Blood from Arm, Right Updated: 10/19/23 0951     Hemoglobin A1C 5.70 %     Basic Metabolic Panel [[id number]]  (Abnormal) Collected: 10/19/23 0357    Specimen: Blood from Arm, Right Updated: 10/19/23 0446     Glucose 133 mg/dL      BUN 11 mg/dL      Creatinine 0.75 mg/dL      Sodium 139 mmol/L       Potassium 3.8 mmol/L      Comment: Slight hemolysis detected by analyzer. Results may be affected.        Chloride 106 mmol/L      CO2 23.0 mmol/L      Calcium 8.7 mg/dL      BUN/Creatinine Ratio 14.7     Anion Gap 10.0 mmol/L      eGFR 100.2 mL/min/1.73     Narrative:      GFR Normal >60  Chronic Kidney Disease <60  Kidney Failure <15      CBC Auto Differential [[id number]]  (Abnormal) Collected: 10/19/23 0357    Specimen: Blood from Arm, Right Updated: 10/19/23 0417     WBC 11.40 10*3/mm3      RBC 4.00 10*6/mm3      Hemoglobin 12.7 g/dL      Hematocrit 37.8 %      MCV 94.4 fL      MCH 31.7 pg      MCHC 33.5 g/dL      RDW 13.4 %      RDW-SD 46.4 fl      MPV 8.0 fL      Platelets 333 10*3/mm3      Neutrophil % 71.9 %      Lymphocyte % 20.9 %      Monocyte % 6.3 %      Eosinophil % 0.4 %      Basophil % 0.5 %      Neutrophils, Absolute 8.20 10*3/mm3      Lymphocytes, Absolute 2.40 10*3/mm3      Monocytes, Absolute 0.70 10*3/mm3      Eosinophils, Absolute 0.00 10*3/mm3      Basophils, Absolute 0.10 10*3/mm3      nRBC 0.0 /100 WBC     High Sensitivity Troponin T 2Hr [[id number]] Collected: 10/18/23 2357    Specimen: Blood Updated: 10/19/23 0032     HS Troponin T <6 ng/L      Troponin T Delta --     Comment: Unable to calculate.       Narrative:      High Sensitive Troponin T Reference Range:  <10.0 ng/L- Negative Female for AMI  <15.0 ng/L- Negative Male for AMI  >=10 - Abnormal Female indicating possible myocardial injury.  >=15 - Abnormal Male indicating possible myocardial injury.   Clinicians would have to utilize clinical acumen, EKG, Troponin, and serial changes to determine if it is an Acute Myocardial Infarction or myocardial injury due to an underlying chronic condition.         hCG, Serum, Qualitative [[id number]]  (Normal) Collected: 10/18/23 2145    Specimen: Blood Updated: 10/18/23 2348     HCG Qualitative Negative    [place] Draw [[id number]] Collected: 10/18/23 2145    Specimen: Blood Updated: 10/18/23 2247     Narrative:      The following orders were created for panel order [place] Draw.  Procedure                               Abnormality         Status                     ---------                               -----------         ------                     Gold Top - SST[[id number]]                                   Final result               Light Blue Top[[id number]]                                   Final result                 Please view results for these tests on the individual orders.    Gold Top - SST [[id number]] Collected: 10/18/23 2145    Specimen: Blood Updated: 10/18/23 2247     Extra Tube Hold for add-ons.     Comment: Auto resulted.       Light Blue Top [[id number]] Collected: 10/18/23 2145    Specimen: Blood Updated: 10/18/23 2247     Extra Tube Hold for add-ons.     Comment: Auto resulted       High Sensitivity Troponin T [[id number]]  (Normal) Collected: 10/18/23 2145    Specimen: Blood Updated: 10/18/23 2227     HS Troponin T <6 ng/L     Narrative:      High Sensitive Troponin T Reference Range:  <10.0 ng/L- Negative Female for AMI  <15.0 ng/L- Negative Male for AMI  >=10 - Abnormal Female indicating possible myocardial injury.  >=15 - Abnormal Male indicating possible myocardial injury.   Clinicians would have to utilize clinical acumen, EKG, Troponin, and serial changes to determine if it is an Acute Myocardial Infarction or myocardial injury due to an underlying chronic condition.         TSH [[id number]]  (Normal) Collected: 10/18/23 2145    Specimen: Blood Updated: 10/18/23 2227     TSH 1.940 uIU/mL     Comprehensive Metabolic Panel [[id number]]  (Abnormal) Collected: 10/18/23 2145    Specimen: Blood Updated: 10/18/23 2220     Glucose 108 mg/dL      BUN 13 mg/dL      Creatinine 0.88 mg/dL      Sodium 136 mmol/L      Potassium 3.9 mmol/L      Chloride 102 mmol/L      CO2 22.0 mmol/L      Calcium 8.9 mg/dL      Total Protein 7.0 g/dL      Albumin 4.0 g/dL      ALT (SGPT) 14 U/L      AST (SGOT) 14 U/L       Alkaline Phosphatase 67 U/L      Total Bilirubin 0.2 mg/dL      Globulin 3.0 gm/dL      A/G Ratio 1.3 g/dL      BUN/Creatinine Ratio 14.8     Anion Gap 12.0 mmol/L      eGFR 82.7 mL/min/1.73     Narrative:      GFR Normal >60  Chronic Kidney Disease <60  Kidney Failure <15      Ethanol [[id number]] Collected: 10/18/23 2145    Specimen: Blood Updated: 10/18/23 2220     Ethanol % <0.010 %     Narrative:      Plasma Ethanol Clinical Symptoms:    ETOH (%)               Clinical Symptom  .01-.05              No apparent influence  .03-.12              Euphoria, Diminished judgment and attention   .09-.25              Impaired comprehension, Muscle incoordination  .18-.30              Confusion, Staggered gait, Slurred speech  .25-.40              Markedly decreased response to stimuli, unable to stand or                        walk, vomitting, sleep or stupor  .35-.50              Comatose, Anesthesia, Subnormal body temperature        Magnesium [[id number]]  (Normal) Collected: 10/18/23 2145    Specimen: Blood Updated: 10/18/23 2220     Magnesium 2.0 mg/dL     CBC & Differential [[id number]]  (Abnormal) Collected: 10/18/23 2145    Specimen: Blood Updated: 10/18/23 2202    Narrative:      The following orders were created for panel order CBC & Differential.  Procedure                               Abnormality         Status                     ---------                               -----------         ------                     CBC Auto Differential[[id number]]        Abnormal            Final result                 Please view results for these tests on the individual orders.    CBC Auto Differential [[id number]]  (Abnormal) Collected: 10/18/23 2145    Specimen: Blood Updated: 10/18/23 2202     WBC 13.50 10*3/mm3      RBC 4.15 10*6/mm3      Hemoglobin 13.2 g/dL      Hematocrit 39.3 %      MCV 94.7 fL      MCH 31.9 pg      MCHC 33.7 g/dL      RDW 13.3 %      RDW-SD 45.9 fl      MPV 8.1 fL      Platelets 368 10*3/mm3       Neutrophil % 77.9 %      Lymphocyte % 13.8 %      Monocyte % 7.0 %      Eosinophil % 0.6 %      Basophil % 0.7 %      Neutrophils, Absolute 10.50 10*3/mm3      Lymphocytes, Absolute 1.90 10*3/mm3      Monocytes, Absolute 0.90 10*3/mm3      Eosinophils, Absolute 0.10 10*3/mm3      Basophils, Absolute 0.10 10*3/mm3      nRBC 0.0 /100 WBC             Imaging Results (Most Recent)       Procedure Component Value Units Date/Time    MRI Brain Without Contrast [[id number]] Collected: 10/19/23 1244     Updated: 10/19/23 1247    Narrative:      MRI BRAIN WO CONTRAST    Date of Exam: 10/19/2023 12:02 PM EDT    Indication: Syncope/presyncope, cerebrovascular cause suspected.     Comparison: None available.    Technique:  Routine multiplanar/multisequence sequence images of the brain were obtained without contrast administration.      Findings:  No acute infarct is present on diffusion weighted sequences. Midline structures are normal and the craniocervical junction appears satisfactory. Gray-white differentiation is maintained and there is no evidence of intracranial hemorrhage, mass or mass   effect. The ventricles are normal in size and configuration. The orbits are normal. The paranasal sinuses appear clear. Intracranial arterial flow voids are maintained.      Impression:      Impression:  Normal noncontrast MRI of the brain as above.        Electronically Signed: Max Newton MD    10/19/2023 12:45 PM EDT    Workstation ID: [id number]    XR Spine Cervical Complete 4 or 5 View [[id number]] Collected: 10/19/23 0029     Updated: 10/19/23 0032    Narrative:      XR SPINE CERVICAL COMPLETE 4 OR 5 VW    Date of Exam: 10/19/2023 12:11 AM EDT    Indication: neck pain    Comparison: None available.    Findings:  There are 7 cervical type vertebral bodies in anatomic alignment. No evidence of fracture or compression deformity.  No significant spondylolisthesis. No significant degenerative changes are present throughout the spine. No  evidence of significant   neuroforaminal stenosis. The prevertebral soft tissues appear within normal limits.      Impression:      Impression:  No acute osseous abnormality or significant degenerative change.      Electronically Signed: Danika Cardenas MD    10/19/2023 12:30 AM EDT    Workstation ID: [id number]    CT Head Without Contrast [[id number]] Collected: 10/18/23 2328     Updated: 10/18/23 2331    Narrative:      CT HEAD WO CONTRAST    Date of Exam: 10/18/2023 11:15 PM EDT    Indication: syncope and collapse.    Comparison: None available.    Technique: Axial CT images were obtained of the head without contrast administration.  Coronal reconstructions were performed.  Automated exposure control and iterative reconstruction methods were used.      Findings:  There is no evidence of hemorrhage. There is no mass effect or midline shift.    There is no extracerebral collection.    Ventricles are normal in size and configuration for patient's stated age.      Posterior fossa is within normal limits.    Calvarium and skull base appear intact.   Visualized sinuses show no air fluid levels. Visualized orbits are unremarkable.      Impression:      Impression:  No acute intracranial process identified.        Electronically Signed: Danika Cardenas MD    10/18/2023 11:29 PM EDT    Workstation ID: [id number]    XR Chest 1 View [[id number]] Collected: 10/18/23 2217     Updated: 10/18/23 2219    Narrative:      XR CHEST 1 VW    Date of Exam: 10/18/2023 9:41 PM EDT    Indication: syncope    Comparison: None available.    Findings:  Lungs are clear without focal consolidation, overt edema, large effusion or pneumothorax. Heart size within normal limits. Osseous structures are grossly unremarkable.        Impression:      No acute cardiopulmonary abnormality.        Electronically Signed: Tony Braun MD    10/18/2023 10:17 PM EDT    Workstation ID: [id number]          reviewed    ECG/EMG Results (most recent)       Procedure  Component Value Units Date/Time    SCANNED - TELEMETRY   [[id number]] Resulted: 10/18/23     Updated: 10/19/23 0502    SCANNED - TELEMETRY   [[id number]] Resulted: 10/18/23     Updated: 10/19/23 0502    ECG 12 Lead Syncope [[id number]] Collected: 10/18/23 2124     Updated: 10/19/23 0745     QT Interval 394 ms      QTC Interval 401 ms     Narrative:      HEART RATE= 62  bpm  RR Interval= 964  ms  RI Interval= 140  ms  P Horizontal Axis= 4  deg  P Front Axis= 46  deg  QRSD Interval= 83  ms  QT Interval= 394  ms  QTcB= 401  ms  QRS Axis= 16  deg  T Wave Axis= 34  deg  - OTHERWISE NORMAL ECG -  Sinus rhythm  Low voltage, precordial leads  No previous ECG available for comparison  Electronically Signed By: Barron Betancourt (PHILIP) 19-Oct-2023 07:45:42  Date and Time of Study: 2023-10-18 21:24:32    SCANNED - TELEMETRY   [[id number]] Resulted: 10/18/23     Updated: 10/19/23 0812    Adult Transthoracic Echo Complete W/ Cont if Necessary Per Protocol [[id number]] Resulted: 10/19/23 1335     Updated: 10/19/23 1338     LVIDd 3.9 cm      LVIDs 2.7 cm      IVSd 0.84 cm      LVPWd 0.79 cm      FS 30.9 %      IVS/LVPW 1.06 cm      ESV(cubed) 19.7 ml      LV Sys Vol (BSA corrected) 15.1 cm2      EDV(cubed) 59.6 ml      LV López Vol (BSA corrected) 36.5 cm2      LV mass(C)d 91.9 grams      LVOT area 2.47 cm2      LVOT diam 1.77 cm      EDV(MOD-sp4) 72.3 ml      ESV(MOD-sp4) 29.8 ml      SV(MOD-sp4) 42.4 ml      SI(MOD-sp4) 21.4 ml/m2      EF(MOD-sp4) 58.7 %      MV E max kevin 74.5 cm/sec      MV A max kevin 42.3 cm/sec      MV dec time 0.16 sec      MV E/A 1.76     Pulm A Revs Dur 0.07 sec      SV(LVOT) 45.2 ml      RVIDd 3.2 cm      Pulm Sys Kevin 58.5 cm/sec      Pulm López Kevin 60.9 cm/sec      Pulm S/D 0.96     Pulm A Revs Kevin 24.4 cm/sec      LV V1 max 92.5 cm/sec      LV V1 max PG 3.4 mmHg      LV V1 mean PG 1.70 mmHg      LV V1 VTI 18.3 cm      Ao pk kevin 127.6 cm/sec      Ao max PG 6.5 mmHg      Ao mean PG 3.4 mmHg      Ao V2 VTI 26.3 cm       KATHIE(I,D) 1.72 cm2      MV max PG 3.1 mmHg      MV mean PG 0.81 mmHg      MV V2 VTI 27.7 cm      MVA(VTI) 1.63 cm2      MV dec slope 463.3 cm/sec2      MR max kevin 269.7 cm/sec      MR max PG 29.2 mmHg      TR max kevin 198.3 cm/sec      TR max PG 15.9 mmHg      RVSP(TR) 18.9 mmHg      RAP systole 3.0 mmHg      RV V1 max PG 1.63 mmHg      RV V1 max 63.9 cm/sec      RV V1 VTI 14.5 cm      PA V2 max 86.8 cm/sec      PA acc time 0.14 sec      Ao root diam 2.8 cm      ACS 1.72 cm      EF(MOD-bp) 59.0 %      LA dimension (2D)  3.6 cm     Narrative:        Left ventricular systolic function is normal. Calculated left   ventricular EF = 59% Left ventricular ejection fraction appears to be 56 -   60%.    Left ventricular diastolic function was normal.    Estimated right ventricular systolic pressure from tricuspid   regurgitation is normal (<35 mmHg).    Mild mitral and tricuspid regurgitation.            reviewed    Results for orders placed during the hospital encounter of 10/18/23    Duplex Carotid Ultrasound CAR    Interpretation Summary    Right internal carotid artery demonstrates normal flow without evidence of hemodynamically significant stenosis.    Left internal carotid artery demonstrates normal flow without evidence of hemodynamically significant stenosis.    1.6 x 1.3 cm cystic mass right lobe of thyroid      Results for orders placed during the hospital encounter of 10/18/23    Adult Transthoracic Echo Complete W/ Cont if Necessary Per Protocol    Interpretation Summary    Left ventricular systolic function is normal. Calculated left ventricular EF = 59% Left ventricular ejection fraction appears to be 56 - 60%.    Left ventricular diastolic function was normal.    Estimated right ventricular systolic pressure from tricuspid regurgitation is normal (<35 mmHg).    Mild mitral and tricuspid regurgitation.      Microbiology Results (last 10 days)       ** No results found for the last 240 hours. **             Assessment & Plan     Syncope     Syncope  - wbc 13, 11 today  - cmp unremarkable  - tsh, hcg, mag and ethanol unremarkable  - ua + nitrites, 3+ bacteria  - EKG rate 62 sinus no st elevation  - UDS + THC  - Chest xray reviewed and showing no acute processes  - Ct head and cervical spine reviewed and showing no fracture, deformity or abnormal intra cranial processes.  - MRI brain- normal mri of brain  - US carotid- normal flow through both right and left carotids  - ECHO- no valvular disease EF 59%  - stress test- low risk for ischemic heart disease  - cardiology consulted- reviewed testig and recommends mcot  - neurology consulted- neurology cleared for discharge and pt can follow up with neurology as outpt  - PT consulted and has no further recommendations      I discussed the patients findings and my recommendations with patient and nursing staff.     Discharge Diagnosis:      Syncope      Hospital Course  Patient is a 43 y.o. female presented with syncopal episode x 1. Pt had + loc, nausea and headache. No hx of syncope. Er evaluated and admitted to observation. Labs including cmp, tsh, hcg, mag ua, ethanol are unremarkable. Wbc was 13 then decreased to 11. Uds was + for thc. EKG was normal. Chest xray showed nothing acute. Ct head, cervical spine and mri of brain were all unremarkable. Us carotid showed normal flow through both carotids. It also showed a 1 x 1 nodule in the thyroid which the patient was notified about and instructed to follow up with pcp for further testing and ent consult. Echo showed no valvular disease and ef was 59%. Stress test was low risk for ischemia. Neurology and cardiology were consulted and are ok with discharge. Mcot recommended at discharge and ordered. Pt has no further recommendations for pt. Discharge discussed with pt and she is agreeable to plan. Instructed pt to return to er if symptoms reoccur or worsen.       Past Medical History:   History reviewed. No pertinent past  medical history.    Past Surgical History:     Past Surgical History:   Procedure Laterality Date    HYSTERECTOMY      HYSTEROSCOPY ENDOMETRIAL ABLATION Bilateral        Social History:   Social History     Socioeconomic History    Marital status: Single   Tobacco Use    Smoking status: Every Day     Packs/day: .5     Types: Cigarettes    Smokeless tobacco: Never   Vaping Use    Vaping Use: Never used   Substance and Sexual Activity    Alcohol use: Yes     Alcohol/week: 1.0 standard drink of alcohol     Types: 1 Cans of beer per week     Comment: social drinker    Drug use: Never    Sexual activity: Yes     Partners: Male       Procedures Performed         Consults:   Consults       Date and Time Order Name Status Description    10/19/2023 12:34 AM Inpatient Neurology Consult Other (see comments) Completed             Condition on Discharge:     Stable    Discharge Disposition  Home or Self Care    Discharge Medications     Discharge Medications        Continue These Medications        Instructions Start Date   aspirin 81 MG EC tablet   81 mg, Oral, Daily      escitalopram 20 MG tablet  Commonly known as: LEXAPRO   20 mg, Oral, Daily               Discharge Diet:     Activity at Discharge:     Follow-up Appointments  No future appointments.  Additional Instructions for the Follow-ups that You Need to Schedule       Discharge Follow-up with PCP   As directed       Currently Documented PCP:    Provider, No Known    PCP Phone Number:    None     Follow Up Details: 1- 2 weeks                Test Results Pending at Discharge       Risk for Readmission (LACE) Score: 1 (10/19/2023  6:00 AM)      Less Than 30 minutes spent in discharge activities for this patient    Maru Tong PA-C  10/20/23  07:42 EDT

## 2023-11-01 ENCOUNTER — OFFICE VISIT (OUTPATIENT)
Dept: CARDIOLOGY | Facility: CLINIC | Age: 43
End: 2023-11-01
Payer: COMMERCIAL

## 2023-11-01 VITALS
HEART RATE: 62 BPM | DIASTOLIC BLOOD PRESSURE: 69 MMHG | BODY MASS INDEX: 34.84 KG/M2 | WEIGHT: 222 LBS | OXYGEN SATURATION: 100 % | HEIGHT: 67 IN | SYSTOLIC BLOOD PRESSURE: 108 MMHG

## 2023-11-01 DIAGNOSIS — R55 SYNCOPE AND COLLAPSE: Primary | ICD-10-CM

## 2023-11-01 PROCEDURE — 99214 OFFICE O/P EST MOD 30 MIN: CPT | Performed by: INTERNAL MEDICINE

## 2023-11-01 RX ORDER — BUPROPION HYDROCHLORIDE 150 MG/1
150 TABLET, EXTENDED RELEASE ORAL DAILY
COMMUNITY
Start: 2023-10-27

## 2023-11-01 NOTE — PROGRESS NOTES
Encounter Date:11/01/2023  Last seen 10/19/2023      Patient ID: Dawn Jarrell is a 43 y.o. female.    Chief complaint  Syncope    History of present illness.  Patient recently was admitted to hospital with syncope.  Work-up was negative.  Patient was discharged home with 30-day event monitor.    Since I have last seen, the patient has been without any chest discomfort ,shortness of breath, palpitations, dizziness or syncope.  Denies having any headache ,abdominal pain ,nausea, vomiting , diarrhea constipation, loss of weight or loss of appetite.  Denies having any excessive bruising ,hematuria or blood in the stool.    Review of all systems negative except as indicated.    Reviewed ROS.    Assessment and plan  ]]]]]]]]]]]]]]]]]]]]]]  Impression  ===========  -Syncopal episode x1.-No further episodes  Associated nausea and being hot.  Cannot exclude vasovagal episode.  EKG showed no acute changes.  Troponin levels are negative.  CT scan of the head was negative.    Echocardiogram 10/9/2023     Left ventricular systolic function is normal. Calculated left ventricular EF = 59% Left ventricular ejection fraction appears to be 56 - 60%.    Left ventricular diastolic function was normal.    Estimated right ventricular systolic pressure from tricuspid regurgitation is normal (<35 mmHg).    Mild mitral and tricuspid regurgitation.    Stress Cardiolite test-normal-10/19/2023    -Status post hysterectomy     - Smoker     - Allergic to latex  ===========  Plan  =========  Syncope-improved-no further episodes.    Stress Cardiolite test-normal as above  Echocardiogram-normal as above  Patient was educated regarding the results of the testing.     30-day event monitor in progress.    Follow-up in the office in 6 weeks with EKG.    Further plan will depend on patient's progress.    Reviewed and updated-11/1/2023  ]]]]]]]]]]]]]]]]]]             Diagnosis Plan   1. Syncope and collapse        LAB RESULTS (LAST 7  DAYS)    CBC        BMP        CMP         BNP        TROPONIN        CoAg        Creatinine Clearance  Estimated Creatinine Clearance: 115.7 mL/min (by C-G formula based on SCr of 0.75 mg/dL).    ABG        Radiology  No radiology results for the last day                The following portions of the patient's history were reviewed and updated as appropriate: allergies, current medications, past family history, past medical history, past social history, past surgical history, and problem list.    Review of Systems   Constitutional: Negative for fever and malaise/fatigue.   HENT:  Negative for ear pain and nosebleeds.    Eyes:  Negative for blurred vision and double vision.   Cardiovascular:  Negative for chest pain, dyspnea on exertion, leg swelling and palpitations.   Respiratory:  Negative for cough and shortness of breath.    Skin:  Negative for rash.   Musculoskeletal:  Negative for joint pain.   Gastrointestinal:  Negative for abdominal pain, nausea and vomiting.   Neurological:  Negative for dizziness, focal weakness, headaches, light-headedness and numbness.   Psychiatric/Behavioral:  Negative for depression. The patient is not nervous/anxious.    All other systems reviewed and are negative.        Current Outpatient Medications:     aspirin 81 MG EC tablet, Take 1 tablet by mouth Daily., Disp: , Rfl:     buPROPion SR (WELLBUTRIN SR) 150 MG 12 hr tablet, Take 1 tablet by mouth Daily., Disp: , Rfl:     escitalopram (LEXAPRO) 20 MG tablet, Take 1 tablet by mouth Daily., Disp: , Rfl:     Allergies   Allergen Reactions    Latex Unknown - Low Severity       Family History   Problem Relation Age of Onset    Hypertension Mother     Stroke Mother     Hypertension Father     Colon cancer Father        Past Surgical History:   Procedure Laterality Date    HYSTERECTOMY      HYSTEROSCOPY ENDOMETRIAL ABLATION Bilateral        History reviewed. No pertinent past medical history.    Family History   Problem Relation Age of  "Onset    Hypertension Mother     Stroke Mother     Hypertension Father     Colon cancer Father        Social History     Socioeconomic History    Marital status: Single   Tobacco Use    Smoking status: Every Day     Packs/day: .5     Types: Cigarettes     Passive exposure: Current    Smokeless tobacco: Never   Vaping Use    Vaping Use: Never used   Substance and Sexual Activity    Alcohol use: Yes     Alcohol/week: 1.0 standard drink of alcohol     Types: 1 Cans of beer per week     Comment: social drinker    Drug use: Never    Sexual activity: Yes     Partners: Male         Procedures      Objective:       Physical Exam    /69   Pulse 62   Ht 170.2 cm (67\")   Wt 101 kg (222 lb)   SpO2 100%   BMI 34.77 kg/m²   The patient is alert, oriented and in no distress.    Vital signs as noted above.    Head and neck revealed no carotid bruits or jugular venous distension.  No thyromegaly or lymphadenopathy is present.    Lungs clear.  No wheezing.  Breath sounds are normal bilaterally.    Heart normal first and second heart sounds.  No murmur..  No pericardial rub is present.  No gallop is present.    Abdomen soft and nontender.  No organomegaly is present.    Extremities revealed good peripheral pulses without any pedal edema.    Skin warm and dry.    Musculoskeletal system is grossly normal.    CNS grossly normal.    Reviewed and updated.        "

## 2023-11-02 ENCOUNTER — PATIENT ROUNDING (BHMG ONLY) (OUTPATIENT)
Dept: CARDIOLOGY | Facility: CLINIC | Age: 43
End: 2023-11-02
Payer: COMMERCIAL

## 2023-11-17 NOTE — PROGRESS NOTES
Encounter Date:12/13/2023  Last seen 11/1/2023      Patient ID: Dawn Jarrell is a 43 y.o. female.    Chief complaint  Syncope     History of present illness.  Since I have last seen, the patient has been without any chest discomfort ,shortness of breath, palpitations, dizziness or syncope.  Denies having any headache ,abdominal pain ,nausea, vomiting , diarrhea constipation, loss of weight or loss of appetite.  Denies having any excessive bruising ,hematuria or blood in the stool.    Review of all systems negative except as indicated.    Reviewed ROS.  Assessment and plan  ]]]]]]]]]]]]]]]]]]]]]]  Impression  ===========  -Syncopal episode x1.-No further episodes  Associated nausea and being hot.  Possible vasovagal episode.  EKG showed no acute changes.  Troponin levels are negative.  CT scan of the head was negative.    30-day event monitor 11/27/2023-Dr. Leblanc   A relatively benign monitor study.    Study with 18 days 11 hours of recording 1 patient triggered event High heart rates of 167 low of 41 average of 79 No atrial fibrillation or flutter No pauses AV block or abnormal tacky or bradycardia arrhythmias 1% burden of PVCs, 0% PACs, short self-limited run of paroxysmal atrial tachycardia was seen which was self-limited and self terminating and benign finding.     Echocardiogram 10/9/2023     Left ventricular systolic function is normal. Calculated left ventricular EF = 59% Left ventricular ejection fraction appears to be 56 - 60%.    Left ventricular diastolic function was normal.    Estimated right ventricular systolic pressure from tricuspid regurgitation is normal (<35 mmHg).    Mild mitral and tricuspid regurgitation.     Stress Cardiolite test-normal-10/19/2023     -Status post hysterectomy     - Smoker     - Allergic to latex  ===========  Plan  =========  Syncope-improved-no further episodes.     Stress Cardiolite test-normal as above  Echocardiogram-normal as above.     30-day event monitor-as  above.  Patient was educated regarding the results of the testing.    EKG showed sinus rhythm without ischemic changes     Follow-up in the office in 6 months.    Further plan will depend on patient's progress.     Reviewed and updated-12/13/2023.  ]]]]]]]]]]]]]]]]]]               Diagnosis Plan   1. Syncope and collapse        LAB RESULTS (LAST 7 DAYS)    CBC        BMP        CMP         BNP        TROPONIN        CoAg        Creatinine Clearance  CrCl cannot be calculated (Patient's most recent lab result is older than the maximum 30 days allowed.).    ABG        Radiology  No radiology results for the last day                The following portions of the patient's history were reviewed and updated as appropriate: allergies, current medications, past family history, past medical history, past social history, past surgical history, and problem list.    Review of Systems   Constitutional: Negative for malaise/fatigue.   Cardiovascular:  Negative for chest pain, leg swelling, palpitations and syncope.   Respiratory:  Negative for shortness of breath.    Skin:  Negative for rash.   Gastrointestinal:  Negative for nausea and vomiting.   Neurological:  Negative for dizziness, light-headedness and numbness.   All other systems reviewed and are negative.        Current Outpatient Medications:     aspirin 81 MG EC tablet, Take 1 tablet by mouth Daily., Disp: , Rfl:     escitalopram (LEXAPRO) 20 MG tablet, Take 1 tablet by mouth Daily., Disp: , Rfl:     meclizine (ANTIVERT) 25 MG tablet, Take 1 tablet by mouth 3 (Three) Times a Day As Needed for Dizziness., Disp: 90 tablet, Rfl: 1    omeprazole (priLOSEC) 40 MG capsule, Take 1 capsule by mouth Daily., Disp: 30 capsule, Rfl: 6    buPROPion SR (WELLBUTRIN SR) 150 MG 12 hr tablet, Take 1 tablet by mouth Daily. (Patient not taking: Reported on 11/30/2023), Disp: , Rfl:     Allergies   Allergen Reactions    Latex Unknown - Low Severity       Family History   Problem Relation  "Age of Onset    Hypertension Mother     Stroke Mother     Hypertension Father     Colon cancer Father     Colon cancer Maternal Grandmother     Colon cancer Maternal Grandfather     Colon cancer Paternal Grandmother        Past Surgical History:   Procedure Laterality Date    HYSTERECTOMY      HYSTEROSCOPY ENDOMETRIAL ABLATION Bilateral        History reviewed. No pertinent past medical history.    Family History   Problem Relation Age of Onset    Hypertension Mother     Stroke Mother     Hypertension Father     Colon cancer Father     Colon cancer Maternal Grandmother     Colon cancer Maternal Grandfather     Colon cancer Paternal Grandmother        Social History     Socioeconomic History    Marital status: Single   Tobacco Use    Smoking status: Every Day     Packs/day: .5     Types: Cigarettes     Passive exposure: Current    Smokeless tobacco: Never   Vaping Use    Vaping Use: Never used   Substance and Sexual Activity    Alcohol use: Yes     Alcohol/week: 1.0 standard drink of alcohol     Types: 1 Cans of beer per week     Comment: social drinker    Drug use: Never    Sexual activity: Yes     Partners: Male           ECG 12 Lead    Date/Time: 12/13/2023 2:23 PM  Performed by: Robbie Mei MD    Authorized by: Robbie Mei MD  Comparison: compared with previous ECG   Similar to previous ECG  Comparison to previous ECG: Sinus bradycardia 58/min nonspecific ST-T wave changes normal axis normal intervals no ectopy no significant change from previous EKG.        Objective:       Physical Exam    /76 (BP Location: Left arm, Patient Position: Sitting, Cuff Size: Large Adult)   Pulse 58   Ht 170.2 cm (67\")   Wt 101 kg (223 lb)   SpO2 100% Comment: RA  BMI 34.93 kg/m²   The patient is alert, oriented and in no distress.    Vital signs as noted above.    Head and neck revealed no carotid bruits or jugular venous distension.  No thyromegaly or lymphadenopathy is present.    Lungs clear.  No wheezing. "  Breath sounds are normal bilaterally.    Heart normal first and second heart sounds.  No murmur..  No pericardial rub is present.  No gallop is present.    Abdomen soft and nontender.  No organomegaly is present.    Extremities revealed good peripheral pulses without any pedal edema.    Skin warm and dry.    Musculoskeletal system is grossly normal.    CNS grossly normal.    Reviewed and updated.

## 2023-11-30 ENCOUNTER — OFFICE VISIT (OUTPATIENT)
Dept: FAMILY MEDICINE CLINIC | Facility: CLINIC | Age: 43
End: 2023-11-30
Payer: COMMERCIAL

## 2023-11-30 VITALS
HEIGHT: 67 IN | BODY MASS INDEX: 34.88 KG/M2 | DIASTOLIC BLOOD PRESSURE: 84 MMHG | WEIGHT: 222.2 LBS | HEART RATE: 72 BPM | SYSTOLIC BLOOD PRESSURE: 125 MMHG | OXYGEN SATURATION: 95 % | TEMPERATURE: 98 F

## 2023-11-30 DIAGNOSIS — Z00.01 ENCOUNTER FOR GENERAL ADULT MEDICAL EXAMINATION WITH ABNORMAL FINDINGS: Primary | ICD-10-CM

## 2023-11-30 DIAGNOSIS — E07.9 THYROID MASS: ICD-10-CM

## 2023-11-30 DIAGNOSIS — Z11.59 NEED FOR HEPATITIS C SCREENING TEST: ICD-10-CM

## 2023-11-30 DIAGNOSIS — R42 VERTIGO: ICD-10-CM

## 2023-11-30 DIAGNOSIS — K21.9 GASTROESOPHAGEAL REFLUX DISEASE, UNSPECIFIED WHETHER ESOPHAGITIS PRESENT: ICD-10-CM

## 2023-11-30 DIAGNOSIS — Z12.11 ENCOUNTER FOR SCREENING FOR MALIGNANT NEOPLASM OF COLON: ICD-10-CM

## 2023-11-30 RX ORDER — OMEPRAZOLE 40 MG/1
40 CAPSULE, DELAYED RELEASE ORAL DAILY
Qty: 30 CAPSULE | Refills: 6 | Status: SHIPPED | OUTPATIENT
Start: 2023-11-30

## 2023-11-30 RX ORDER — MECLIZINE HYDROCHLORIDE 25 MG/1
25 TABLET ORAL 3 TIMES DAILY PRN
Qty: 90 TABLET | Refills: 1 | Status: SHIPPED | OUTPATIENT
Start: 2023-11-30

## 2023-11-30 NOTE — PROGRESS NOTES
Subjective   Dawn Jarrell is a 43 y.o. female presents for   Chief Complaint   Patient presents with    Establish Care     Declines flu shot    Loss of Consciousness     Hospital follow up from 6 weeks ago       Health Maintenance Due   Topic Date Due    COLORECTAL CANCER SCREENING  Never done    COVID-19 Vaccine (1) Never done    Pneumococcal Vaccine 0-64 (1 - PCV) Never done    TDAP/TD VACCINES (2 - Td or Tdap) 10/07/2019    HEPATITIS C SCREENING  Never done    ANNUAL PHYSICAL  Never done       History of Present Illness   Pt present to establish care.  She states it has been several years since being seen by pcp.  She has been with GYN yearly who is managing lexapro.  She was recently prescribed wellbutrin to help with smoking cessation.   She was recently seen in the ER for a syncopal episode.  There were no clear findings of the cause and she has also followed up with cardiology.  She denies further symptoms.  She will continue to follow-up with cardiology as indicated.  She reports that during ultrasound for her carotids it was noted that she had a mass on her right thyroid.  I will order dedicated imaging for the thyroid for further management.  She does report a several year history of vertigo occurring every 3 to 4 months and requests a prescription for Antivert.  Discussed with patient different causes of vertigo and also prescribed as needed Antivert for her use.  Patient instructed to call for worsening.  Health history reviewed and patient encouraged to have a colon cancer screening.  She reports a family history significant for colon cancer in several family members.  She is agreeable to referral.  She also states that she feels like food is getting stuck frequently in upper part of abdomen.  She reports having acid reflux sx all the time.  She states reflux has improved some since decreasing stress.  She has been taking zantac 3-4 times a week.  Discussed with patient the risk and benefits of  "omeprazole and she is agreeable to try it.  Explained to her that she will also meet with general surgeon to discuss symptoms prior to colonoscopy and they may desire to schedule an EGD.  Discussed current recommended vaccines and patient declines at this time.  Patient counseled on importance of balanced diet and routine exercise.    Vitals:    11/30/23 0859   BP: 125/84   BP Location: Right arm   Patient Position: Sitting   Cuff Size: Large Adult   Pulse: 72   Temp: 98 °F (36.7 °C)   TempSrc: Tympanic   SpO2: 95%   Weight: 101 kg (222 lb 3.2 oz)   Height: 170.2 cm (67.01\")     Body mass index is 34.79 kg/m².    Current Outpatient Medications on File Prior to Visit   Medication Sig Dispense Refill    aspirin 81 MG EC tablet Take 1 tablet by mouth Daily.      escitalopram (LEXAPRO) 20 MG tablet Take 1 tablet by mouth Daily.      buPROPion SR (WELLBUTRIN SR) 150 MG 12 hr tablet Take 1 tablet by mouth Daily. (Patient not taking: Reported on 11/30/2023)       No current facility-administered medications on file prior to visit.       The following portions of the patient's history were reviewed and updated as appropriate: allergies, current medications, past family history, past medical history, past social history, past surgical history, and problem list.    Review of Systems   Gastrointestinal:  Positive for GERD.   Neurological:  Positive for dizziness.       Objective   Physical Exam  Vitals and nursing note reviewed.   Constitutional:       Appearance: Normal appearance. She is well-developed. She is obese.   HENT:      Head: Normocephalic and atraumatic.      Right Ear: Tympanic membrane, ear canal and external ear normal.      Left Ear: Tympanic membrane, ear canal and external ear normal.      Nose: Nose normal.   Eyes:      Extraocular Movements: Extraocular movements intact.      Conjunctiva/sclera: Conjunctivae normal.      Pupils: Pupils are equal, round, and reactive to light.   Cardiovascular:      Rate " and Rhythm: Normal rate and regular rhythm.      Pulses: Normal pulses.      Heart sounds: Normal heart sounds.   Pulmonary:      Effort: Pulmonary effort is normal.      Breath sounds: Normal breath sounds.   Abdominal:      General: Bowel sounds are normal.      Palpations: Abdomen is soft.   Genitourinary:     Vagina: Normal.   Musculoskeletal:         General: Normal range of motion.      Cervical back: Normal range of motion and neck supple.   Skin:     General: Skin is warm and dry.   Neurological:      General: No focal deficit present.      Mental Status: She is alert and oriented to person, place, and time.   Psychiatric:         Mood and Affect: Mood normal.         Behavior: Behavior normal.         Judgment: Judgment normal.       PHQ-9 Total Score:      Assessment & Plan   Diagnoses and all orders for this visit:    1. Encounter for general adult medical examination with abnormal findings (Primary)    2. Encounter for screening for malignant neoplasm of colon  -     Ambulatory Referral For Screening Colonoscopy    3. Need for hepatitis C screening test    4. Thyroid mass  -     US Thyroid    5. Gastroesophageal reflux disease, unspecified whether esophagitis present  -     omeprazole (priLOSEC) 40 MG capsule; Take 1 capsule by mouth Daily.  Dispense: 30 capsule; Refill: 6    6. Vertigo  -     meclizine (ANTIVERT) 25 MG tablet; Take 1 tablet by mouth 3 (Three) Times a Day As Needed for Dizziness.  Dispense: 90 tablet; Refill: 1        Patient Instructions   You are due for adacel Tdap vaccination. (provides protection against tetanus, diptheria and whooping cough) Please  get the immunization at your local pharmacy at your earliest convenience. This immunization will next be due in 10 years. Please click on the link for more information about this vaccine.    CDC Tdap Vaccine Information

## 2023-11-30 NOTE — PATIENT INSTRUCTIONS
You are due for adacel Tdap vaccination. (provides protection against tetanus, diptheria and whooping cough) Please  get the immunization at your local pharmacy at your earliest convenience. This immunization will next be due in 10 years. Please click on the link for more information about this vaccine.    [facility] Tdap Vaccine Information

## 2023-12-13 ENCOUNTER — OFFICE VISIT (OUTPATIENT)
Dept: CARDIOLOGY | Facility: CLINIC | Age: 43
End: 2023-12-13
Payer: COMMERCIAL

## 2023-12-13 VITALS
BODY MASS INDEX: 35 KG/M2 | OXYGEN SATURATION: 100 % | DIASTOLIC BLOOD PRESSURE: 76 MMHG | HEART RATE: 58 BPM | HEIGHT: 67 IN | SYSTOLIC BLOOD PRESSURE: 115 MMHG | WEIGHT: 223 LBS

## 2023-12-13 DIAGNOSIS — R55 SYNCOPE AND COLLAPSE: Primary | ICD-10-CM

## 2023-12-13 PROCEDURE — 93000 ELECTROCARDIOGRAM COMPLETE: CPT | Performed by: INTERNAL MEDICINE

## 2023-12-13 PROCEDURE — 99213 OFFICE O/P EST LOW 20 MIN: CPT | Performed by: INTERNAL MEDICINE

## 2023-12-21 ENCOUNTER — HOSPITAL ENCOUNTER (OUTPATIENT)
Dept: ULTRASOUND IMAGING | Facility: HOSPITAL | Age: 43
Discharge: HOME | End: 2023-12-21
Admitting: NURSE PRACTITIONER
Payer: COMMERCIAL

## 2023-12-21 ENCOUNTER — OFFICE VISIT (OUTPATIENT)
Age: 43
End: 2023-12-21
Payer: COMMERCIAL

## 2023-12-21 VITALS
SYSTOLIC BLOOD PRESSURE: 134 MMHG | HEART RATE: 73 BPM | OXYGEN SATURATION: 99 % | BODY MASS INDEX: 35.25 KG/M2 | WEIGHT: 224.6 LBS | DIASTOLIC BLOOD PRESSURE: 83 MMHG | TEMPERATURE: 98.4 F | HEIGHT: 67 IN

## 2023-12-21 DIAGNOSIS — Z12.11 ENCOUNTER FOR SCREENING COLONOSCOPY: Primary | ICD-10-CM

## 2023-12-21 PROCEDURE — 76536 US EXAM OF HEAD AND NECK: CPT

## 2023-12-21 RX ORDER — SODIUM, POTASSIUM,MAG SULFATES 17.5-3.13G
SOLUTION, RECONSTITUTED, ORAL ORAL
Qty: 177 ML | Refills: 0 | Status: SHIPPED | OUTPATIENT
Start: 2023-12-21

## 2023-12-21 RX ORDER — SODIUM CHLORIDE, SODIUM LACTATE, POTASSIUM CHLORIDE, CALCIUM CHLORIDE 600; 310; 30; 20 MG/100ML; MG/100ML; MG/100ML; MG/100ML
30 INJECTION, SOLUTION INTRAVENOUS CONTINUOUS
OUTPATIENT
Start: 2023-12-21

## 2023-12-21 RX ORDER — MULTIPLE VITAMINS W/ MINERALS TAB 9MG-400MCG
1 TAB ORAL DAILY
COMMUNITY

## 2023-12-21 NOTE — PROGRESS NOTES
Colorectal Surgery Consultation Note     ID: Dawn Jarrell  DATE OF VISIT  12/21/2023 Chief Complaint Consult (NP Ref CARLOS [place], screening colonoscopy; positive history for colon cancer in the family; pt never had colonoscopy in past)     History of Present Illness   Dawn Jarrell is a 43 y.o. female who I was asked to see in consultation by Dr. Jane for colon cancer screening. Patient states she has a bowel movement 1-2 per day. It is normal to in consistency. Patient has no bleeding with stools; she has no pain with bowel movements; she has no itching; she has no protrusion of tissue while straining.       Last colonoscopy: Never had one   Family history: Father diagnosed of colon cancer at age 52, Grand parents on both mother and father side had colon cancer    No cologuard  No prior genetic testing      Past Medical History   Past Medical History:   Diagnosis Date    PONV (postoperative nausea and vomiting) 5/22      Past Surgical History   Past Surgical History:   Procedure Laterality Date    HYSTERECTOMY      HYSTEROSCOPY ENDOMETRIAL ABLATION Bilateral       Family History   Family History   Problem Relation Age of Onset    Hypertension Mother     Stroke Mother     Hypertension Father     Colon cancer Father     Cancer Father         Colon    Colon cancer Maternal Grandmother     Colon cancer Maternal Grandfather     Colon cancer Paternal Grandmother     No colorectal cancer history in immediate family members.   Social History   Social History     Tobacco Use    Smoking status: Every Day     Packs/day: .5     Types: Cigarettes     Passive exposure: Current    Smokeless tobacco: Never    Tobacco comments:     Patient reports been prescribed wellbutrin to help her quit   Vaping Use    Vaping Use: Never used   Substance Use Topics    Alcohol use: Yes     Alcohol/week: 1.0 standard drink of alcohol     Types: 1 Cans of beer per week     Comment: social drinker    Drug use: Never    For  further details please see Health History Questionnaire scanned in Epic.   Medication List @[email or web address]@   Allergies   Allergies   Allergen Reactions    Latex Unknown - Low Severity      Review of Systems : All systems reviewed and are otherwise negative, pertinent positives noted in the HPI and Health History Questionnaire scanned in Epic.     Physical Exam   General: No acute distress   Head: Normocephalic, atraumatic   Neuro: Alert and oriented     Assessment   -Need for colon cancer screening     Plan / Recommendations   1. I explained the rationale to the patient for colonoscopy screening. We discussed that in the asymptomatic individual the risk of colon cancer is 5-6%. Additionally we went over the procedure and that we will use sedation to help with the procedure. We went over the risks and benefits of the procedure which include, but are not limited to bleeding and perforation.   2. Patient understands that she is at higher risk of colon and rectal cancer due to her family history. Patient understands and consents to proceed with colonoscopy. Over 15 minutes were spent in direct face-to-face time with >50% in counseling, going over risk of colon cancer.      Dorian Gomes MD  Colon and Rectal Surgery   [address], [address]  T: [phone]          14:03 EST; 12/21/2023 cc: Bonnie Jane APRN

## 2023-12-21 NOTE — H&P (VIEW-ONLY)
Colorectal Surgery Consultation Note     ID: Dawn Jarrell  DATE OF VISIT  12/21/2023 Chief Complaint Consult (NP Ref CARLOS D[place], screening colonoscopy; positive history for colon cancer in the family; pt never had colonoscopy in past)     History of Present Illness   Dawn Jarrell is a 43 y.o. female who I was asked to see in consultation by Dr. Jane for colon cancer screening. Patient states she has a bowel movement 1-2 per day. It is normal to in consistency. Patient has no bleeding with stools; she has no pain with bowel movements; she has no itching; she has no protrusion of tissue while straining.       Last colonoscopy: Never had one   Family history: Father diagnosed of colon cancer at age 52, Grand parents on both mother and father side had colon cancer    No cologuard  No prior genetic testing      Past Medical History   Past Medical History:   Diagnosis Date    PONV (postoperative nausea and vomiting) 5/22      Past Surgical History   Past Surgical History:   Procedure Laterality Date    HYSTERECTOMY      HYSTEROSCOPY ENDOMETRIAL ABLATION Bilateral       Family History   Family History   Problem Relation Age of Onset    Hypertension Mother     Stroke Mother     Hypertension Father     Colon cancer Father     Cancer Father         Colon    Colon cancer Maternal Grandmother     Colon cancer Maternal Grandfather     Colon cancer Paternal Grandmother     No colorectal cancer history in immediate family members.   Social History   Social History     Tobacco Use    Smoking status: Every Day     Packs/day: .5     Types: Cigarettes     Passive exposure: Current    Smokeless tobacco: Never    Tobacco comments:     Patient reports been prescribed wellbutrin to help her quit   Vaping Use    Vaping Use: Never used   Substance Use Topics    Alcohol use: Yes     Alcohol/week: 1.0 standard drink of alcohol     Types: 1 Cans of beer per week     Comment: social drinker    Drug use: Never    For  further details please see Health History Questionnaire scanned in Epic.   Medication List @cmedscurrent@   Allergies   Allergies   Allergen Reactions    Latex Unknown - Low Severity      Review of Systems : All systems reviewed and are otherwise negative, pertinent positives noted in the HPI and Health History Questionnaire scanned in Epic.     Physical Exam   General: No acute distress   Head: Normocephalic, atraumatic   Neuro: Alert and oriented     Assessment   -Need for colon cancer screening     Plan / Recommendations   1. I explained the rationale to the patient for colonoscopy screening. We discussed that in the asymptomatic individual the risk of colon cancer is 5-6%. Additionally we went over the procedure and that we will use sedation to help with the procedure. We went over the risks and benefits of the procedure which include, but are not limited to bleeding and perforation.   2. Patient understands that she is at higher risk of colon and rectal cancer due to her family history. Patient understands and consents to proceed with colonoscopy. Over 15 minutes were spent in direct face-to-face time with >50% in counseling, going over risk of colon cancer.      Dorian Gomes MD  Colon and Rectal Surgery   [address], [address]  T: [phone]          14:03 EST; 12/21/2023 cc: Bonnie Jane APRN

## 2024-01-02 ENCOUNTER — PATIENT ROUNDING (BHMG ONLY) (OUTPATIENT)
Age: 44
End: 2024-01-02
Payer: COMMERCIAL

## 2024-01-08 ENCOUNTER — ANESTHESIA EVENT (OUTPATIENT)
Dept: GASTROENTEROLOGY | Facility: HOSPITAL | Age: 44
End: 2024-01-08
Payer: COMMERCIAL

## 2024-01-08 ENCOUNTER — ANESTHESIA (OUTPATIENT)
Dept: GASTROENTEROLOGY | Facility: HOSPITAL | Age: 44
End: 2024-01-08
Payer: COMMERCIAL

## 2024-01-08 ENCOUNTER — HOSPITAL ENCOUNTER (OUTPATIENT)
Facility: HOSPITAL | Age: 44
Setting detail: HOSPITAL OUTPATIENT SURGERY
Discharge: HOME | End: 2024-01-08
Attending: STUDENT IN AN ORGANIZED HEALTH CARE EDUCATION/TRAINING PROGRAM | Admitting: STUDENT IN AN ORGANIZED HEALTH CARE EDUCATION/TRAINING PROGRAM
Payer: COMMERCIAL

## 2024-01-08 VITALS
RESPIRATION RATE: 20 BRPM | HEART RATE: 60 BPM | WEIGHT: 221.34 LBS | TEMPERATURE: 98.5 F | SYSTOLIC BLOOD PRESSURE: 122 MMHG | BODY MASS INDEX: 33.55 KG/M2 | OXYGEN SATURATION: 98 % | DIASTOLIC BLOOD PRESSURE: 67 MMHG | HEIGHT: 68 IN

## 2024-01-08 DIAGNOSIS — Z12.11 ENCOUNTER FOR SCREENING COLONOSCOPY: ICD-10-CM

## 2024-01-08 PROCEDURE — 88305 TISSUE EXAM BY PATHOLOGIST: CPT | Performed by: STUDENT IN AN ORGANIZED HEALTH CARE EDUCATION/TRAINING PROGRAM

## 2024-01-08 PROCEDURE — 25010000002 PROPOFOL 10 MG/ML EMULSION: Performed by: NURSE ANESTHETIST, CERTIFIED REGISTERED

## 2024-01-08 PROCEDURE — 25010000002 GLYCOPYRROLATE 1 MG/5ML SOLUTION: Performed by: NURSE ANESTHETIST, CERTIFIED REGISTERED

## 2024-01-08 PROCEDURE — 25010000002 PROPOFOL 200 MG/20ML EMULSION: Performed by: NURSE ANESTHETIST, CERTIFIED REGISTERED

## 2024-01-08 PROCEDURE — 25810000003 SODIUM CHLORIDE 0.9 % SOLUTION: Performed by: STUDENT IN AN ORGANIZED HEALTH CARE EDUCATION/TRAINING PROGRAM

## 2024-01-08 PROCEDURE — 25810000003 SODIUM CHLORIDE 0.9 % SOLUTION: Performed by: NURSE ANESTHETIST, CERTIFIED REGISTERED

## 2024-01-08 RX ORDER — PROPOFOL 10 MG/ML
INJECTION, EMULSION INTRAVENOUS AS NEEDED
Status: DISCONTINUED | OUTPATIENT
Start: 2024-01-08 | End: 2024-01-08 | Stop reason: SURG

## 2024-01-08 RX ORDER — EPHEDRINE SULFATE 5 MG/ML
5 INJECTION INTRAVENOUS ONCE AS NEEDED
Status: DISCONTINUED | OUTPATIENT
Start: 2024-01-08 | End: 2024-01-08 | Stop reason: HOSPADM

## 2024-01-08 RX ORDER — LIDOCAINE HYDROCHLORIDE 10 MG/ML
INJECTION, SOLUTION EPIDURAL; INFILTRATION; INTRACAUDAL; PERINEURAL AS NEEDED
Status: DISCONTINUED | OUTPATIENT
Start: 2024-01-08 | End: 2024-01-08 | Stop reason: SURG

## 2024-01-08 RX ORDER — ONDANSETRON 2 MG/ML
4 INJECTION INTRAMUSCULAR; INTRAVENOUS ONCE AS NEEDED
Status: DISCONTINUED | OUTPATIENT
Start: 2024-01-08 | End: 2024-01-08 | Stop reason: HOSPADM

## 2024-01-08 RX ORDER — SODIUM CHLORIDE 9 MG/ML
INJECTION, SOLUTION INTRAVENOUS CONTINUOUS PRN
Status: DISCONTINUED | OUTPATIENT
Start: 2024-01-08 | End: 2024-01-08 | Stop reason: SURG

## 2024-01-08 RX ORDER — SODIUM CHLORIDE 9 MG/ML
10 INJECTION, SOLUTION INTRAVENOUS ONCE
Status: COMPLETED | OUTPATIENT
Start: 2024-01-08 | End: 2024-01-08

## 2024-01-08 RX ORDER — MEPERIDINE HYDROCHLORIDE 25 MG/ML
12.5 INJECTION INTRAMUSCULAR; INTRAVENOUS; SUBCUTANEOUS
Status: DISCONTINUED | OUTPATIENT
Start: 2024-01-08 | End: 2024-01-08 | Stop reason: HOSPADM

## 2024-01-08 RX ORDER — LABETALOL HYDROCHLORIDE 5 MG/ML
5 INJECTION, SOLUTION INTRAVENOUS
Status: DISCONTINUED | OUTPATIENT
Start: 2024-01-08 | End: 2024-01-08 | Stop reason: HOSPADM

## 2024-01-08 RX ORDER — DIPHENHYDRAMINE HYDROCHLORIDE 50 MG/ML
12.5 INJECTION INTRAMUSCULAR; INTRAVENOUS
Status: DISCONTINUED | OUTPATIENT
Start: 2024-01-08 | End: 2024-01-08 | Stop reason: HOSPADM

## 2024-01-08 RX ORDER — GLYCOPYRROLATE 0.2 MG/ML
INJECTION INTRAMUSCULAR; INTRAVENOUS AS NEEDED
Status: DISCONTINUED | OUTPATIENT
Start: 2024-01-08 | End: 2024-01-08 | Stop reason: SURG

## 2024-01-08 RX ORDER — HYDRALAZINE HYDROCHLORIDE 20 MG/ML
5 INJECTION INTRAMUSCULAR; INTRAVENOUS
Status: DISCONTINUED | OUTPATIENT
Start: 2024-01-08 | End: 2024-01-08 | Stop reason: HOSPADM

## 2024-01-08 RX ORDER — IPRATROPIUM BROMIDE AND ALBUTEROL SULFATE 2.5; .5 MG/3ML; MG/3ML
3 SOLUTION RESPIRATORY (INHALATION) ONCE AS NEEDED
Status: DISCONTINUED | OUTPATIENT
Start: 2024-01-08 | End: 2024-01-08 | Stop reason: HOSPADM

## 2024-01-08 RX ADMIN — SODIUM CHLORIDE: 9 INJECTION, SOLUTION INTRAVENOUS at 10:44

## 2024-01-08 RX ADMIN — GLYCOPYRROLATE 0.2 MG: 0.2 INJECTION INTRAMUSCULAR; INTRAVENOUS at 10:46

## 2024-01-08 RX ADMIN — PROPOFOL 50 MG: 10 INJECTION, EMULSION INTRAVENOUS at 10:47

## 2024-01-08 RX ADMIN — PROPOFOL 200 MCG/KG/MIN: 10 INJECTION, EMULSION INTRAVENOUS at 10:47

## 2024-01-08 RX ADMIN — SODIUM CHLORIDE 10 ML/HR: 9 INJECTION, SOLUTION INTRAVENOUS at 09:34

## 2024-01-08 RX ADMIN — LIDOCAINE HYDROCHLORIDE 50 MG: 10 INJECTION, SOLUTION EPIDURAL; INFILTRATION; INTRACAUDAL; PERINEURAL at 10:47

## 2024-01-08 NOTE — OP NOTE
[facility] Colorectal Surgery  Colonoscopy Examination     Name: Dawn Jarrell  : 1980  Date of Colonoscopy: 2024  Procedure: Average Risk Screening Colonoscopy  Surgeon: Dorian Gomes MD   Anesthesia: Sedation   IV Fluids: refer to anesthesia record    Procedure Details:    Prior to the procedure, history and physical exam was performed. Patient's medication and allergies were reviewed. The risk and benefits of the procedure and sedation options and risks were discussed with the patient. All questions were answered and informed consent was obtained.    The patient was brought to the endoscopy suite and placed in the left lateral decubitus position. Conscious sedation was administered by the anesthesia team. Vital signs including blood pressure, heart rate, and oxygen saturation were monitored continuously throughout the procedure.    The colonoscope was introduced through the rectum and advanced through the entire colon under direct visualization. The scope was maneuvered carefully, and the mucosa of the rectum, sigmoid colon, descending colon, transverse colon, ascending colon, and cecum were thoroughly inspected. Adequate insufflation was maintained throughout the procedure for optimal visualization.    Findings:    Rectum: Normal appearance with no lesions,polyps, or abnormalities.  Sigmoid colon: Normal appearance with no lesions. Hyperplastic Polyps. Two 5 mm polyps removed.                                                Descending colon: Normal appearance with no lesions,polyps, or abnormalities  Transverse colon: Normal appearance with no lesions,polyps, or abnormalities.  Ascending colon: Normal appearance with no lesions,polyps, or abnormalities.  Cecum: Normal appearance.One 5 mm polyp removed.    Procedure Images:              Appendiceal Orifice  Ileocecal Valve Cecal Polyp    Hemorrhoids        Interventions:  During the procedure, a single diminutive polyp measuring 5 mm in size was  identified in the cecum colon. The polyp was visualized and removed using cold biopsy forceps. Additional two 5mm polyps were removed in the sigmoid colon. Hemostasis was achieved successfully at the site of polypectomy without any immediate complications.     Specimens:  The removed polyp was retrieved and sent for histopathological examination to the pathology department for further analysis.    Recommendation: To be determined based on biopsy results and issued as separate report    Conclusion:  The screening colonoscopy was completed successfully, and the entire colon was visualized without any complications. The patient tolerated the procedure well and was transferred to the recovery area in stable condition. Post-procedure instructions and follow-up were discussed with the patient and will be provided in written form.    Dorian Gomes MD  Colon and Rectal Surgery   [address], [address]  T: [phone]

## 2024-01-08 NOTE — ANESTHESIA PREPROCEDURE EVALUATION
Anesthesia Evaluation     Patient summary reviewed and Nursing notes reviewed   history of anesthetic complications:  PONV  NPO Solid Status: > 8 hours  NPO Liquid Status: > 2 hours           Airway   Mallampati: II  TM distance: >3 FB  Neck ROM: full  No difficulty expected  Dental - normal exam     Pulmonary - normal exam   Cardiovascular - normal exam    ECG reviewed        Neuro/Psych  (+) syncope  GI/Hepatic/Renal/Endo    (+) GERD    Musculoskeletal     Abdominal  - normal exam    Bowel sounds: normal.   Substance History      OB/GYN          Other                    Anesthesia Plan    ASA 2     general and MAC     intravenous induction     Anesthetic plan, risks, benefits, and alternatives have been provided, discussed and informed consent has been obtained with: patient.    Plan discussed with CRNA.    CODE STATUS:

## 2024-01-08 NOTE — DISCHARGE INSTRUCTIONS
A responsible adult should stay with you and you should rest quietly for the rest of the day.    Do not drink alcohol, drive, operate any heavy machinery or power tools or make any legal/important decisions for the next 24 hours.     Progress your diet as tolerated.  If you begin to experience severe pain, increased shortness of breath, racing heartbeat or a fever above 101 F, seek immediate medical attention.     Follow up with MD as instructed. Call office for results in 3 to 5 days if needed.     Dr Gomes @ [phone]    POST COLONOSCOPY DISCHARGE INSTRUCTIONS    Immediate Action:  Please call our clinic at [phone] if you experience any of the following symptoms:    Fever over 100°F  Abnormal abdominal pain  Rectal bleeding more than a tablespoon  Nausea or vomiting that does not resolve  After Clinic Hours:  You will be directed to our on-call physician for assistance.    Emergencies:  For emergent needs, please call 911.    Cautionary Note for Sedation: If sedation was administered during the procedure, exercise caution as these medications can alter judgment and reflexes for at least 12 hours afterward. It is mandatory to have someone responsible over 18 years of age drive you home and stay with you.    Avoid Activities:  Please refrain from:    Driving  Consuming alcoholic beverages  Taking additional sedatives  Signing legal documents  Operating heavy machinery  Resumption of Normal Activities:  You may:    Resume a normal diet unless instructed otherwise by your doctor.  Resume normal medications unless otherwise instructed by your doctor.  Bloating:   Bloating may occur over the next 1-2 days, which is normal post-procedure.    Follow-Up for Results:  If polyps or tissue samples were removed during your procedure, anticipate receiving your provider's interpretation of results approximately 7-14 business days after the procedure. This information will be conveyed via [employer] (if signed up), letter  via mail, or a phone call.      Dorian Gomes MD  Colon and Rectal Surgery   [facility]

## 2024-01-08 NOTE — ANESTHESIA POSTPROCEDURE EVALUATION
Patient: Dawn Jarrell    Procedure Summary       Date: 01/08/24 Room / Location: [facility] ENDOSCOPY 4 / [facility] ENDOSCOPY    Anesthesia Start: 1044 Anesthesia Stop: 1126    Procedure: COLONOSCOPY with polypectomy x 2 Diagnosis:       Encounter for screening colonoscopy      (Encounter for screening colonoscopy [Z12.11])    Surgeons: Dorian Gomes MD Provider: Micha Montoya MD    Anesthesia Type: general, MAC ASA Status: 2            Anesthesia Type: general, MAC    Vitals  Vitals Value Taken Time   /67 01/08/24 1145   Temp     Pulse 60 01/08/24 1145   Resp 20 01/08/24 1145   SpO2 98 % 01/08/24 1145           Post Anesthesia Care and Evaluation    Patient location during evaluation: PACU  Patient participation: complete - patient participated  Level of consciousness: awake  Pain scale: See nurse's notes for pain score.  Pain management: adequate    Airway patency: patent  Anesthetic complications: No anesthetic complications  PONV Status: none  Cardiovascular status: acceptable  Respiratory status: acceptable and spontaneous ventilation  Hydration status: acceptable    Comments: Patient seen and examined postoperatively; vital signs stable; SpO2 greater than or equal to 90%; cardiopulmonary status stable; nausea/vomiting adequately controlled; pain adequately controlled; no apparent anesthesia complications; patient discharged from anesthesia care when discharge criteria were met

## 2024-01-10 LAB
LAB AP CASE REPORT: NORMAL
PATH REPORT.FINAL DX SPEC: NORMAL
PATH REPORT.GROSS SPEC: NORMAL

## 2024-01-10 NOTE — PROGRESS NOTES
Follow Up Discussion on Pathology Results and Recommendations     I have called Dawn Jarrell  and discussed her pathology report from her recent colonoscopy. The results indicated a tubular adenoma and hyperplastic from the polyps removed.       In light of these findings and her family history, we discussed her next colonoscopy should be 5 years. All of her questions were addressed.       Dorian Gomes MD  Colon and Rectal Surgery   [address], [address]  T: [phone]  [image]

## 2024-05-29 ENCOUNTER — OFFICE VISIT (OUTPATIENT)
Dept: FAMILY MEDICINE CLINIC | Facility: CLINIC | Age: 44
End: 2024-05-29
Payer: COMMERCIAL

## 2024-05-29 VITALS
TEMPERATURE: 98.7 F | DIASTOLIC BLOOD PRESSURE: 76 MMHG | HEART RATE: 68 BPM | HEIGHT: 68 IN | BODY MASS INDEX: 34.71 KG/M2 | OXYGEN SATURATION: 98 % | SYSTOLIC BLOOD PRESSURE: 108 MMHG | WEIGHT: 229 LBS

## 2024-05-29 DIAGNOSIS — Z11.59 NEED FOR HEPATITIS C SCREENING TEST: ICD-10-CM

## 2024-05-29 DIAGNOSIS — Z13.1 SCREENING FOR DIABETES MELLITUS: ICD-10-CM

## 2024-05-29 DIAGNOSIS — Z00.00 ROUTINE GENERAL MEDICAL EXAMINATION AT A HEALTH CARE FACILITY: Primary | ICD-10-CM

## 2024-05-29 DIAGNOSIS — G25.81 RESTLESS LEGS: ICD-10-CM

## 2024-05-29 DIAGNOSIS — Z12.31 ENCOUNTER FOR SCREENING MAMMOGRAM FOR MALIGNANT NEOPLASM OF BREAST: ICD-10-CM

## 2024-05-29 DIAGNOSIS — K21.9 GASTROESOPHAGEAL REFLUX DISEASE, UNSPECIFIED WHETHER ESOPHAGITIS PRESENT: ICD-10-CM

## 2024-05-29 DIAGNOSIS — E66.9 OBESITY (BMI 30.0-34.9): ICD-10-CM

## 2024-05-29 LAB
ALBUMIN SERPL-MCNC: 4.2 G/DL (ref 3.5–5.2)
ALBUMIN/GLOB SERPL: 1.4 G/DL
ALP SERPL-CCNC: 76 U/L (ref 39–117)
ALT SERPL W P-5'-P-CCNC: 12 U/L (ref 1–33)
ANION GAP SERPL CALCULATED.3IONS-SCNC: 12 MMOL/L (ref 5–15)
AST SERPL-CCNC: 10 U/L (ref 1–32)
BASOPHILS # BLD AUTO: 0.06 10*3/MM3 (ref 0–0.2)
BASOPHILS NFR BLD AUTO: 0.7 % (ref 0–1.5)
BILIRUB SERPL-MCNC: 0.2 MG/DL (ref 0–1.2)
BUN SERPL-MCNC: 10 MG/DL (ref 6–20)
BUN/CREAT SERPL: 9.1 (ref 7–25)
CALCIUM SPEC-SCNC: 9.1 MG/DL (ref 8.6–10.5)
CHLORIDE SERPL-SCNC: 103 MMOL/L (ref 98–107)
CHOLEST SERPL-MCNC: 259 MG/DL (ref 0–200)
CO2 SERPL-SCNC: 22 MMOL/L (ref 22–29)
CREAT SERPL-MCNC: 1.1 MG/DL (ref 0.57–1)
DEPRECATED RDW RBC AUTO: 42.3 FL (ref 37–54)
EGFRCR SERPLBLD CKD-EPI 2021: 62.9 ML/MIN/1.73
EOSINOPHIL # BLD AUTO: 0.1 10*3/MM3 (ref 0–0.4)
EOSINOPHIL NFR BLD AUTO: 1.2 % (ref 0.3–6.2)
ERYTHROCYTE [DISTWIDTH] IN BLOOD BY AUTOMATED COUNT: 12.2 % (ref 12.3–15.4)
GLOBULIN UR ELPH-MCNC: 2.9 GM/DL
GLUCOSE SERPL-MCNC: 86 MG/DL (ref 65–99)
HBA1C MFR BLD: 5.9 % (ref 4.8–5.6)
HCT VFR BLD AUTO: 40.9 % (ref 34–46.6)
HCV AB SER QL: NORMAL
HDLC SERPL-MCNC: 31 MG/DL (ref 40–60)
HGB BLD-MCNC: 13.8 G/DL (ref 12–15.9)
IMM GRANULOCYTES # BLD AUTO: 0.02 10*3/MM3 (ref 0–0.05)
IMM GRANULOCYTES NFR BLD AUTO: 0.2 % (ref 0–0.5)
IRON 24H UR-MRATE: 87 MCG/DL (ref 37–145)
IRON SATN MFR SERPL: 30 % (ref 20–50)
LDLC SERPL CALC-MCNC: 179 MG/DL (ref 0–100)
LDLC/HDLC SERPL: 5.7 {RATIO}
LYMPHOCYTES # BLD AUTO: 2 10*3/MM3 (ref 0.7–3.1)
LYMPHOCYTES NFR BLD AUTO: 23.6 % (ref 19.6–45.3)
MCH RBC QN AUTO: 31.9 PG (ref 26.6–33)
MCHC RBC AUTO-ENTMCNC: 33.7 G/DL (ref 31.5–35.7)
MCV RBC AUTO: 94.5 FL (ref 79–97)
MONOCYTES # BLD AUTO: 0.64 10*3/MM3 (ref 0.1–0.9)
MONOCYTES NFR BLD AUTO: 7.6 % (ref 5–12)
NEUTROPHILS NFR BLD AUTO: 5.64 10*3/MM3 (ref 1.7–7)
NEUTROPHILS NFR BLD AUTO: 66.7 % (ref 42.7–76)
NRBC BLD AUTO-RTO: 0 /100 WBC (ref 0–0.2)
PLATELET # BLD AUTO: 416 10*3/MM3 (ref 140–450)
PMV BLD AUTO: 10.2 FL (ref 6–12)
POTASSIUM SERPL-SCNC: 4.2 MMOL/L (ref 3.5–5.2)
PROT SERPL-MCNC: 7.1 G/DL (ref 6–8.5)
RBC # BLD AUTO: 4.33 10*6/MM3 (ref 3.77–5.28)
SODIUM SERPL-SCNC: 137 MMOL/L (ref 136–145)
TIBC SERPL-MCNC: 291 MCG/DL (ref 298–536)
TRANSFERRIN SERPL-MCNC: 195 MG/DL (ref 200–360)
TRIGL SERPL-MCNC: 257 MG/DL (ref 0–150)
TSH SERPL DL<=0.05 MIU/L-ACNC: 1.42 UIU/ML (ref 0.27–4.2)
VLDLC SERPL-MCNC: 49 MG/DL (ref 5–40)
WBC NRBC COR # BLD AUTO: 8.46 10*3/MM3 (ref 3.4–10.8)

## 2024-05-29 PROCEDURE — 36415 COLL VENOUS BLD VENIPUNCTURE: CPT | Performed by: NURSE PRACTITIONER

## 2024-05-29 PROCEDURE — 99396 PREV VISIT EST AGE 40-64: CPT | Performed by: NURSE PRACTITIONER

## 2024-05-29 PROCEDURE — 84466 ASSAY OF TRANSFERRIN: CPT | Performed by: NURSE PRACTITIONER

## 2024-05-29 PROCEDURE — 85025 COMPLETE CBC W/AUTO DIFF WBC: CPT | Performed by: NURSE PRACTITIONER

## 2024-05-29 PROCEDURE — 83036 HEMOGLOBIN GLYCOSYLATED A1C: CPT | Performed by: NURSE PRACTITIONER

## 2024-05-29 PROCEDURE — 86803 HEPATITIS C AB TEST: CPT | Performed by: NURSE PRACTITIONER

## 2024-05-29 PROCEDURE — 84443 ASSAY THYROID STIM HORMONE: CPT | Performed by: NURSE PRACTITIONER

## 2024-05-29 PROCEDURE — 80061 LIPID PANEL: CPT | Performed by: NURSE PRACTITIONER

## 2024-05-29 PROCEDURE — 83540 ASSAY OF IRON: CPT | Performed by: NURSE PRACTITIONER

## 2024-05-29 PROCEDURE — 99214 OFFICE O/P EST MOD 30 MIN: CPT | Performed by: NURSE PRACTITIONER

## 2024-05-29 PROCEDURE — 80053 COMPREHEN METABOLIC PANEL: CPT | Performed by: NURSE PRACTITIONER

## 2024-05-29 RX ORDER — OMEPRAZOLE 40 MG/1
40 CAPSULE, DELAYED RELEASE ORAL DAILY
Qty: 90 CAPSULE | Refills: 2 | Status: SHIPPED | OUTPATIENT
Start: 2024-05-29

## 2024-05-29 NOTE — PROGRESS NOTES
Venipuncture performed on Right Arm by Idania June MA  with good hemostasis. Patient tolerated well. 05/29/24

## 2024-05-29 NOTE — PATIENT INSTRUCTIONS
You are due for adacel Tdap vaccination. (provides protection against tetanus, diptheria and whooping cough) Please  get the immunization at your local pharmacy at your earliest convenience. This immunization will next be due in 10 years. Please click on the link for more information about this vaccine.    [facility] Tdap Vaccine Information

## 2024-05-29 NOTE — PROGRESS NOTES
"Subjective   Dawn Jarrell is a 44 y.o. female presents for   Chief Complaint   Patient presents with    Heartburn     6 month follow up  Medicine seems to be working well    Dizziness     None at this moment       Health Maintenance Due   Topic Date Due    MAMMOGRAM  Never done    TDAP/TD VACCINES (2 - Td or Tdap) 10/07/2019    COVID-19 Vaccine (1 - 2023-24 season) Never done       History of Present Illness   Patient present for annual exam and with concerns for difficulty losing weight.  She reports that she has been cutting calories, counting carbs, working out more and going to the [employer] 6 days a week.  She states nothing seems to work and no matter what she tries her weight remains the same.  Discussed option of semaglutide with patient if there is no indication in her lab work for difficulty losing weight.  Patient counseled on the risk and benefits of semaglutide.  Patient also counseled on option of compounded semaglutide if her insurance denies branded Wegovy.  Patient informed that it is not FDA approved and also counseled on risk and benefits of it.  She also reports bilateral leg pain.  She describes it as cramping at night mainly when laying down.  She states they feel achey and like they need to move all the time. she states she drinks a lot water throughotu the day.   She also requests a refill of omeprazole.  She states her acid reflux symptoms are very well-controlled on the current medication.  Patient counseled on importance of balanced diet and routine exercise as well as risk and benefits of current recommended vaccines.  Also reviewed current recommended screenings and patient is agreeable to mammogram.  Vitals:    05/29/24 1341   BP: 108/76   BP Location: Left arm   Patient Position: Sitting   Cuff Size: Adult   Pulse: 68   Temp: 98.7 °F (37.1 °C)   TempSrc: Tympanic   SpO2: 98%   Weight: 104 kg (229 lb)   Height: 172.7 cm (67.99\")     Body mass index is 34.83 kg/m².    Current " Outpatient Medications on File Prior to Visit   Medication Sig Dispense Refill    aspirin 81 MG EC tablet Take 1 tablet by mouth Daily.      escitalopram (LEXAPRO) 20 MG tablet Take 1 tablet by mouth Daily.      meclizine (ANTIVERT) 25 MG tablet Take 1 tablet by mouth 3 (Three) Times a Day As Needed for Dizziness. 90 tablet 1    multivitamin with minerals tablet tablet Take 1 tablet by mouth Daily.       No current facility-administered medications on file prior to visit.       The following portions of the patient's history were reviewed and updated as appropriate: allergies, current medications, past family history, past medical history, past social history, past surgical history, and problem list.    Review of Systems    Objective   Physical Exam  Vitals and nursing note reviewed.   Constitutional:       Appearance: Normal appearance. She is well-developed. She is obese.   HENT:      Head: Normocephalic and atraumatic.      Right Ear: External ear normal.      Left Ear: External ear normal.      Nose: Nose normal.   Eyes:      Extraocular Movements: Extraocular movements intact.      Pupils: Pupils are equal, round, and reactive to light.   Cardiovascular:      Rate and Rhythm: Normal rate and regular rhythm.      Heart sounds: Normal heart sounds.   Pulmonary:      Effort: Pulmonary effort is normal.      Breath sounds: Normal breath sounds.   Abdominal:      General: Bowel sounds are normal.      Palpations: Abdomen is soft.   Genitourinary:     Vagina: Normal.   Musculoskeletal:         General: Normal range of motion.      Cervical back: Normal range of motion and neck supple.   Skin:     General: Skin is warm and dry.   Neurological:      General: No focal deficit present.      Mental Status: She is alert and oriented to person, place, and time.   Psychiatric:         Mood and Affect: Mood normal.         Behavior: Behavior normal.         Judgment: Judgment normal.       PHQ-9 Total Score:      Assessment &  Plan   Diagnoses and all orders for this visit:    1. Routine general medical examination at a health care facility (Primary)  -     CBC Auto Differential  -     Comprehensive Metabolic Panel  -     Lipid Panel  -     TSH    2. Encounter for screening mammogram for malignant neoplasm of breast  -     Mammo Screening Digital Tomosynthesis Bilateral With CAD; Future    3. Need for hepatitis C screening test  -     Hepatitis C Antibody    4. Gastroesophageal reflux disease, unspecified whether esophagitis present  Assessment & Plan:  Well-controlled on omeprazole    Orders:  -     omeprazole (priLOSEC) 40 MG capsule; Take 1 capsule by mouth Daily.  Dispense: 90 capsule; Refill: 2    5. Screening for diabetes mellitus  -     Hemoglobin A1c    6. Restless legs  -     Iron Profile    7. Obesity (BMI 30.0-34.9)  Assessment & Plan:  Patient's (Body mass index is 34.83 kg/m².) indicates that they are obese (BMI >30) with health conditions that include GERD . Weight is unchanged. BMI  is above average; BMI management plan is completed. We discussed low calorie, low carb based diet program, portion control, increasing exercise, joining a fitness center or start home based exercise program, pharmacologic options including semaglutide, and an jeanne-based approach such as [place] Pal or Lose It.  Patient has tried a low-carb diet, low calorie diet, increasing exercise and has also joined the [facility].  She has been unable to lose weight despite her efforts.          Patient Instructions   You are due for adacel Tdap vaccination. (provides protection against tetanus, diptheria and whooping cough) Please  get the immunization at your local pharmacy at your earliest convenience. This immunization will next be due in 10 years. Please click on the link for more information about this vaccine.    CDC Tdap Vaccine Information

## 2024-05-30 ENCOUNTER — PRIOR AUTHORIZATION (OUTPATIENT)
Dept: FAMILY MEDICINE CLINIC | Facility: CLINIC | Age: 44
End: 2024-05-30
Payer: COMMERCIAL

## 2024-05-30 ENCOUNTER — TELEPHONE (OUTPATIENT)
Dept: FAMILY MEDICINE CLINIC | Facility: CLINIC | Age: 44
End: 2024-05-30
Payer: COMMERCIAL

## 2024-05-30 DIAGNOSIS — E66.9 OBESITY (BMI 30.0-34.9): ICD-10-CM

## 2024-05-30 DIAGNOSIS — E66.9 OBESITY (BMI 30.0-34.9): Primary | ICD-10-CM

## 2024-05-30 PROBLEM — E66.811 OBESITY (BMI 30.0-34.9): Status: ACTIVE | Noted: 2024-05-30

## 2024-05-30 PROBLEM — K21.9 GASTROESOPHAGEAL REFLUX DISEASE: Status: ACTIVE | Noted: 2024-05-30

## 2024-05-30 RX ORDER — SEMAGLUTIDE 0.25 MG/.5ML
0.25 INJECTION, SOLUTION SUBCUTANEOUS WEEKLY
Qty: 2 ML | Refills: 0 | Status: SHIPPED | OUTPATIENT
Start: 2024-05-30 | End: 2024-05-30 | Stop reason: SDUPTHER

## 2024-05-30 RX ORDER — SEMAGLUTIDE 0.25 MG/.5ML
0.25 INJECTION, SOLUTION SUBCUTANEOUS WEEKLY
Qty: 2 ML | Refills: 0 | Status: SHIPPED | OUTPATIENT
Start: 2024-05-30

## 2024-05-30 NOTE — TELEPHONE ENCOUNTER
Dawn Jarrell (Sanchez: [id number]) - [id number]  Wegovy 0.25MG/0.5ML auto-injectors  Status: [place] RequestCreated: May 30th, 2024 [id number]: May 30th, 2024

## 2024-05-30 NOTE — TELEPHONE ENCOUNTER
Dawn Jarrell (Sanchez: [id number]) - [id number]  Wegovy 0.25MG/0.5ML auto-injectors  Status: [place] Response - Denied

## 2024-05-30 NOTE — ASSESSMENT & PLAN NOTE
Patient's (Body mass index is 34.83 kg/m².) indicates that they are obese (BMI >30) with health conditions that include GERD . Weight is unchanged. BMI  is above average; BMI management plan is completed. We discussed low calorie, low carb based diet program, portion control, increasing exercise, joining a fitness center or start home based exercise program, pharmacologic options including semaglutide, and an jeanne-based approach such as [place] Pal or Lose It.  Patient has tried a low-carb diet, low calorie diet, increasing exercise and has also joined the [facility].  She has been unable to lose weight despite her efforts.

## 2024-05-30 NOTE — TELEPHONE ENCOUNTER
"  Caller: Dawn Jarrell    Relationship: Self    Best call back number:     [phone] (Mobile)       What medication are you requesting: COMPOUNDED WEGOVY         Have you had these symptoms before:    [x] Yes  [] No    Have you been treated for these symptoms before:   [x] Yes  [] No    If a prescription is needed, what is your preferred pharmacy and phone number: [facility]OUN[facility]\" - [place], [place] - [address] - [phone] [facility] [phone]      Additional notes:          "

## 2024-06-19 ENCOUNTER — PRIOR AUTHORIZATION (OUTPATIENT)
Dept: FAMILY MEDICINE CLINIC | Facility: CLINIC | Age: 44
End: 2024-06-19
Payer: COMMERCIAL

## 2024-06-19 NOTE — TELEPHONE ENCOUNTER
PA sent 06/19/24 for Wegovy 0.25MG/0.5ML auto-injectors  (Key: [id number]  Rx #: [id number]  Form  [facility] Electronic Prior Authorization Form 2017 NCPDP

## 2024-06-27 ENCOUNTER — HOSPITAL ENCOUNTER (OUTPATIENT)
Dept: MAMMOGRAPHY | Facility: HOSPITAL | Age: 44
Discharge: HOME | End: 2024-06-27
Admitting: NURSE PRACTITIONER
Payer: COMMERCIAL

## 2024-06-27 DIAGNOSIS — Z12.31 ENCOUNTER FOR SCREENING MAMMOGRAM FOR MALIGNANT NEOPLASM OF BREAST: ICD-10-CM

## 2024-06-27 PROCEDURE — 77067 SCR MAMMO BI INCL CAD: CPT

## 2024-06-27 PROCEDURE — 77063 BREAST TOMOSYNTHESIS BI: CPT

## 2024-06-28 DIAGNOSIS — N63.12 MASS OF UPPER INNER QUADRANT OF RIGHT BREAST: Primary | ICD-10-CM

## 2024-07-05 DIAGNOSIS — E66.9 OBESITY (BMI 30.0-34.9): ICD-10-CM

## 2024-07-05 NOTE — TELEPHONE ENCOUNTER
Caller: Dawn Jarrell    Relationship: Self    Best call back number:     Requested Prescriptions:   Requested Prescriptions     Pending Prescriptions Disp Refills    Semaglutide-Weight Management (Wegovy) 0.25 MG/0.5ML solution auto-injector 2 mL 0     Sig: Inject 0.5 mL under the skin into the appropriate area as directed 1 (One) Time Per Week.        Pharmacy where request should be sent:  [facility]  [address] [place] [address]  [phone]    Last office visit with prescribing clinician: 5/29/2024   Last telemedicine visit with prescribing clinician: Visit date not found   Next office visit with prescribing clinician: 12/2/2024     Additional details provided by patient:     Does the patient have less than a 3 day supply:  [] Yes  [x] No    Would you like a call back once the refill request has been completed: [x] Yes [] No    If the office needs to give you a call back, can they leave a voicemail: [x] Yes [] No    David Campoverde Rep   07/05/24 13:56 EDT

## 2024-07-05 NOTE — TELEPHONE ENCOUNTER
Rx Refill Note  Requested Prescriptions     Pending Prescriptions Disp Refills    Semaglutide-Weight Management (Wegovy) 0.25 MG/0.5ML solution auto-injector 2 mL 0     Sig: Inject 0.5 mL under the skin into the appropriate area as directed 1 (One) Time Per Week.      Last office visit with prescribing clinician: 5/29/2024   Last telemedicine visit with prescribing clinician: Visit date not found   Next office visit with prescribing clinician: 12/2/2024                         Would you like a call back once the refill request has been completed: [] Yes [] No    If the office needs to give you a call back, can they leave a voicemail: [] Yes [] No    Idania June MA  07/05/24, 14:27 EDT

## 2024-07-08 RX ORDER — SEMAGLUTIDE 0.25 MG/.5ML
0.25 INJECTION, SOLUTION SUBCUTANEOUS WEEKLY
Qty: 2 ML | Refills: 0 | Status: SHIPPED | OUTPATIENT
Start: 2024-07-08

## 2024-07-12 ENCOUNTER — HOSPITAL ENCOUNTER (OUTPATIENT)
Dept: MAMMOGRAPHY | Facility: HOSPITAL | Age: 44
Discharge: HOME | End: 2024-07-12
Payer: COMMERCIAL

## 2024-07-12 ENCOUNTER — HOSPITAL ENCOUNTER (OUTPATIENT)
Dept: ULTRASOUND IMAGING | Facility: HOSPITAL | Age: 44
Discharge: HOME | End: 2024-07-12
Payer: COMMERCIAL

## 2024-07-12 DIAGNOSIS — N63.12 MASS OF UPPER INNER QUADRANT OF RIGHT BREAST: ICD-10-CM

## 2024-07-12 PROCEDURE — 76642 ULTRASOUND BREAST LIMITED: CPT

## 2024-07-29 DIAGNOSIS — E66.9 OBESITY (BMI 30.0-34.9): Primary | ICD-10-CM

## 2024-08-29 ENCOUNTER — TELEPHONE (OUTPATIENT)
Dept: FAMILY MEDICINE CLINIC | Facility: CLINIC | Age: 44
End: 2024-08-29
Payer: COMMERCIAL

## 2024-08-29 NOTE — TELEPHONE ENCOUNTER
Caller: Dawn Jarrell    Relationship to patient: Self    Best call back number: [phone]     Chief complaint: TROUBLE URINATING, DARK URINE, NAUSEA, VOMITING     Type of visit: SAME DAY    Requested date: 08.29.24     If rescheduling, when is the original appointment: N/A     Additional notes:PLEASE CALL PATIENT TO SCHEDULE.

## 2024-09-27 NOTE — PATIENT INSTRUCTIONS
You are due for adacel Tdap vaccination. (provides protection against tetanus, diptheria and whooping cough) Please  get the immunization at your local pharmacy at your earliest convenience.  Please click on the link for more information about this vaccine.    https://www.cdc.gov/vaccines/vpd/dtap-tdap-td/public/index.html

## 2024-10-01 ENCOUNTER — OFFICE VISIT (OUTPATIENT)
Dept: FAMILY MEDICINE CLINIC | Facility: CLINIC | Age: 44
End: 2024-10-01
Payer: COMMERCIAL

## 2024-10-01 VITALS
DIASTOLIC BLOOD PRESSURE: 69 MMHG | HEART RATE: 74 BPM | SYSTOLIC BLOOD PRESSURE: 108 MMHG | TEMPERATURE: 98.4 F | WEIGHT: 222.6 LBS | HEIGHT: 68 IN | OXYGEN SATURATION: 96 % | BODY MASS INDEX: 33.74 KG/M2

## 2024-10-01 DIAGNOSIS — R82.90 FOUL SMELLING URINE: ICD-10-CM

## 2024-10-01 DIAGNOSIS — E66.811 OBESITY (BMI 30.0-34.9): Primary | ICD-10-CM

## 2024-10-01 LAB
BILIRUB BLD-MCNC: NEGATIVE MG/DL
CLARITY, POC: CLEAR
COLOR UR: YELLOW
EXPIRATION DATE: ABNORMAL
GLUCOSE UR STRIP-MCNC: NEGATIVE MG/DL
KETONES UR QL: NEGATIVE
LEUKOCYTE EST, POC: NEGATIVE
Lab: ABNORMAL
NITRITE UR-MCNC: POSITIVE MG/ML
PH UR: 6 [PH] (ref 5–8)
PROT UR STRIP-MCNC: NEGATIVE MG/DL
RBC # UR STRIP: NEGATIVE /UL
SP GR UR: 1.02 (ref 1–1.03)
UROBILINOGEN UR QL: ABNORMAL

## 2024-10-01 PROCEDURE — 99214 OFFICE O/P EST MOD 30 MIN: CPT | Performed by: NURSE PRACTITIONER

## 2024-10-01 PROCEDURE — 87086 URINE CULTURE/COLONY COUNT: CPT | Performed by: NURSE PRACTITIONER

## 2024-10-01 PROCEDURE — 87186 SC STD MICRODIL/AGAR DIL: CPT | Performed by: NURSE PRACTITIONER

## 2024-10-01 PROCEDURE — 87088 URINE BACTERIA CULTURE: CPT | Performed by: NURSE PRACTITIONER

## 2024-10-01 PROCEDURE — 81003 URINALYSIS AUTO W/O SCOPE: CPT | Performed by: NURSE PRACTITIONER

## 2024-10-01 NOTE — PROGRESS NOTES
"Subjective   Dawn Jarrell is a 44 y.o. female presents for   Chief Complaint   Patient presents with    Weight Check     Started wegovy 3 months ago       Health Maintenance Due   Topic Date Due    TDAP/TD VACCINES (2 - Td or Tdap) 10/07/2019       History of Present Illness  The patient is a 44-year-old female who presents for a weight check and follow-up of use of semaglutide.    She reports a weight loss of approximately 14 pounds, as measured on her home scales. She expresses satisfaction with her current medication dosage and wishes to continue it.    She has been experiencing constipation, which she attributes to her current medication. To manage this, she has started taking Colace, which she finds effective. She maintains a high water intake, consuming around 120 ounces daily.    She also mentions an unpleasant odor in her urine and a frequent need to urinate, even with her high water intake. She feels that she is not fully emptying her bladder each time she urinates.    Vitals:    10/01/24 1500   BP: 108/69   BP Location: Left arm   Patient Position: Sitting   Cuff Size: Large Adult   Pulse: 74   Temp: 98.4 °F (36.9 °C)   TempSrc: Tympanic   SpO2: 96%   Weight: 101 kg (222 lb 9.6 oz)   Height: 172.7 cm (67.99\")     Body mass index is 33.85 kg/m².    Current Outpatient Medications on File Prior to Visit   Medication Sig Dispense Refill    aspirin 81 MG EC tablet Take 1 tablet by mouth Daily.      escitalopram (LEXAPRO) 20 MG tablet Take 1 tablet by mouth Daily.      meclizine (ANTIVERT) 25 MG tablet Take 1 tablet by mouth 3 (Three) Times a Day As Needed for Dizziness. 90 tablet 1    multivitamin with minerals tablet tablet Take 1 tablet by mouth Daily.      omeprazole (priLOSEC) 40 MG capsule Take 1 capsule by mouth Daily. 90 capsule 2    [DISCONTINUED] Semaglutide-Weight Management 0.5 MG/0.5ML solution auto-injector Inject 0.5 mL under the skin into the appropriate area as directed 1 (One) Time Per " Week. 2 mL 1     No current facility-administered medications on file prior to visit.       The following portions of the patient's history were reviewed and updated as appropriate: allergies, current medications, past family history, past medical history, past social history, past surgical history, and problem list.    Review of Systems   Genitourinary:  Positive for pelvic pressure and urgency.        Foul smelling urine       Objective   Physical Exam  Vitals and nursing note reviewed.   Constitutional:       Appearance: Normal appearance. She is well-developed. She is obese.   HENT:      Head: Normocephalic and atraumatic.      Right Ear: External ear normal.      Left Ear: External ear normal.      Nose: Nose normal.   Eyes:      Extraocular Movements: Extraocular movements intact.      Pupils: Pupils are equal, round, and reactive to light.   Cardiovascular:      Rate and Rhythm: Normal rate and regular rhythm.      Heart sounds: Normal heart sounds.   Pulmonary:      Effort: Pulmonary effort is normal.      Breath sounds: Normal breath sounds.   Abdominal:      General: Bowel sounds are normal.      Palpations: Abdomen is soft.      Tenderness: There is no right CVA tenderness or left CVA tenderness.   Genitourinary:     Vagina: Normal.   Musculoskeletal:         General: Normal range of motion.      Cervical back: Normal range of motion and neck supple.   Skin:     General: Skin is warm and dry.   Neurological:      General: No focal deficit present.      Mental Status: She is alert and oriented to person, place, and time.   Psychiatric:         Mood and Affect: Mood normal.         Behavior: Behavior normal.         Judgment: Judgment normal.              PHQ-9 Total Score:      Results      Assessment & Plan   Diagnoses and all orders for this visit:    1. Obesity (BMI 30.0-34.9) (Primary)  -     Semaglutide-Weight Management 0.5 MG/0.5ML solution auto-injector; Inject 0.5 mL under the skin into the  appropriate area as directed 1 (One) Time Per Week.  Dispense: 2 mL; Refill: 1    2. Foul smelling urine  -     POCT urinalysis dipstick, automated  -     Urine Culture - Urine, Urine, Clean Catch; Future  -     Urine Culture - Urine, Urine, Clean Catch         1. Weight Management.  She has lost approximately 14 pounds according to her home scale. She reports constipation as a side effect, which is being managed with Colace. She is advised to continue using Colace and to drink plenty of water. MiraLAX can be added if needed, but Colace is preferred to avoid diarrhea. She will continue on her current dose of medication. A refill has been sent to [facility].    2. Urinary Symptoms.  She reports a persistent foul smell in her urine and a constant urge to urinate, with a sensation of incomplete bladder emptying. A urine specimen will be collected to check for a urinary tract infection (UTI). If the UTI test is negative, a referral to urology for bladder emptying studies will be considered.        Patient Instructions   You are due for adacel Tdap vaccination. (provides protection against tetanus, diptheria and whooping cough) Please  get the immunization at your local pharmacy at your earliest convenience.  Please click on the link for more information about this vaccine.    https://www.cdc.gov/vaccines/vpd/dtap-tdap-td/public/index.html            Patient or patient representative verbalized consent for the use of Ambient Listening during the visit with  CARLOS Catalan for chart documentation. 10/1/2024  16:31 EDT

## 2024-10-03 LAB — BACTERIA SPEC AEROBE CULT: ABNORMAL

## 2024-10-03 RX ORDER — NITROFURANTOIN 25; 75 MG/1; MG/1
100 CAPSULE ORAL 2 TIMES DAILY
Qty: 10 CAPSULE | Refills: 0 | Status: SHIPPED | OUTPATIENT
Start: 2024-10-03

## 2024-11-27 ENCOUNTER — PATIENT MESSAGE (OUTPATIENT)
Dept: FAMILY MEDICINE CLINIC | Facility: CLINIC | Age: 44
End: 2024-11-27
Payer: COMMERCIAL

## 2024-11-27 RX ORDER — METRONIDAZOLE 500 MG/1
500 TABLET ORAL 2 TIMES DAILY
Qty: 14 TABLET | Refills: 0 | Status: SHIPPED | OUTPATIENT
Start: 2024-11-27

## 2025-01-27 RX ORDER — ESCITALOPRAM OXALATE 20 MG/1
20 TABLET ORAL DAILY
Qty: 30 TABLET | Refills: 0 | Status: SHIPPED | OUTPATIENT
Start: 2025-01-27

## 2025-02-03 DIAGNOSIS — E66.811 OBESITY (BMI 30.0-34.9): ICD-10-CM

## 2025-02-03 NOTE — TELEPHONE ENCOUNTER
Rx Refill Note  Requested Prescriptions     Pending Prescriptions Disp Refills   • Semaglutide-Weight Management 0.5 MG/0.5ML solution auto-injector 2 mL 1     Sig: Inject 0.5 mL under the skin into the appropriate area as directed 1 (One) Time Per Week.      Last office visit with prescribing clinician: 10/1/2024      Next office visit with prescribing clinician: 5/2/2025   3}  Pamella Brooks  02/03/25, 10:14 EST

## 2025-03-09 RX ORDER — ESCITALOPRAM OXALATE 20 MG/1
20 TABLET ORAL DAILY
Qty: 30 TABLET | Refills: 0 | Status: SHIPPED | OUTPATIENT
Start: 2025-03-09

## 2025-03-26 ENCOUNTER — PATIENT MESSAGE (OUTPATIENT)
Dept: FAMILY MEDICINE CLINIC | Facility: CLINIC | Age: 45
End: 2025-03-26
Payer: COMMERCIAL

## 2025-04-22 RX ORDER — ESCITALOPRAM OXALATE 20 MG/1
20 TABLET ORAL DAILY
Qty: 30 TABLET | Refills: 0 | Status: SHIPPED | OUTPATIENT
Start: 2025-04-22

## 2025-05-02 ENCOUNTER — OFFICE VISIT (OUTPATIENT)
Dept: FAMILY MEDICINE CLINIC | Facility: CLINIC | Age: 45
End: 2025-05-02
Payer: COMMERCIAL

## 2025-05-02 ENCOUNTER — LAB (OUTPATIENT)
Dept: FAMILY MEDICINE CLINIC | Facility: CLINIC | Age: 45
End: 2025-05-02
Payer: COMMERCIAL

## 2025-05-02 VITALS
TEMPERATURE: 98.2 F | HEIGHT: 68 IN | HEART RATE: 78 BPM | DIASTOLIC BLOOD PRESSURE: 85 MMHG | BODY MASS INDEX: 33.43 KG/M2 | WEIGHT: 220.6 LBS | OXYGEN SATURATION: 98 % | SYSTOLIC BLOOD PRESSURE: 128 MMHG

## 2025-05-02 DIAGNOSIS — K21.9 GASTROESOPHAGEAL REFLUX DISEASE, UNSPECIFIED WHETHER ESOPHAGITIS PRESENT: ICD-10-CM

## 2025-05-02 DIAGNOSIS — E66.811 OBESITY (BMI 30.0-34.9): ICD-10-CM

## 2025-05-02 DIAGNOSIS — Z12.31 ENCOUNTER FOR SCREENING MAMMOGRAM FOR MALIGNANT NEOPLASM OF BREAST: ICD-10-CM

## 2025-05-02 DIAGNOSIS — E07.9 THYROID MASS: ICD-10-CM

## 2025-05-02 DIAGNOSIS — Z13.1 SCREENING FOR DIABETES MELLITUS: ICD-10-CM

## 2025-05-02 DIAGNOSIS — F33.1 MODERATE EPISODE OF RECURRENT MAJOR DEPRESSIVE DISORDER: Primary | ICD-10-CM

## 2025-05-02 DIAGNOSIS — E78.2 MIXED HYPERLIPIDEMIA: ICD-10-CM

## 2025-05-02 LAB
ALBUMIN SERPL-MCNC: 3.7 G/DL (ref 3.5–5.2)
ALBUMIN/GLOB SERPL: 1.4 G/DL
ALP SERPL-CCNC: 80 U/L (ref 39–117)
ALT SERPL W P-5'-P-CCNC: 12 U/L (ref 1–33)
ANION GAP SERPL CALCULATED.3IONS-SCNC: 9.5 MMOL/L (ref 5–15)
AST SERPL-CCNC: 14 U/L (ref 1–32)
BASOPHILS # BLD AUTO: 0.04 10*3/MM3 (ref 0–0.2)
BASOPHILS NFR BLD AUTO: 0.6 % (ref 0–1.5)
BILIRUB SERPL-MCNC: 0.2 MG/DL (ref 0–1.2)
BUN SERPL-MCNC: 11 MG/DL (ref 6–20)
BUN/CREAT SERPL: 12.8 (ref 7–25)
CALCIUM SPEC-SCNC: 8.6 MG/DL (ref 8.6–10.5)
CHLORIDE SERPL-SCNC: 105 MMOL/L (ref 98–107)
CHOLEST SERPL-MCNC: 202 MG/DL (ref 0–200)
CO2 SERPL-SCNC: 22.5 MMOL/L (ref 22–29)
CREAT SERPL-MCNC: 0.86 MG/DL (ref 0.57–1)
DEPRECATED RDW RBC AUTO: 45.7 FL (ref 37–54)
EGFRCR SERPLBLD CKD-EPI 2021: 84 ML/MIN/1.73
EOSINOPHIL # BLD AUTO: 0.41 10*3/MM3 (ref 0–0.4)
EOSINOPHIL NFR BLD AUTO: 5.9 % (ref 0.3–6.2)
ERYTHROCYTE [DISTWIDTH] IN BLOOD BY AUTOMATED COUNT: 13.4 % (ref 12.3–15.4)
GLOBULIN UR ELPH-MCNC: 2.6 GM/DL
GLUCOSE SERPL-MCNC: 71 MG/DL (ref 65–99)
HBA1C MFR BLD: 5.5 % (ref 4.8–5.6)
HCT VFR BLD AUTO: 39.3 % (ref 34–46.6)
HDLC SERPL-MCNC: 32 MG/DL (ref 40–60)
HGB BLD-MCNC: 13.3 G/DL (ref 12–15.9)
IMM GRANULOCYTES # BLD AUTO: 0.02 10*3/MM3 (ref 0–0.05)
IMM GRANULOCYTES NFR BLD AUTO: 0.3 % (ref 0–0.5)
LDLC SERPL CALC-MCNC: 146 MG/DL (ref 0–100)
LDLC/HDLC SERPL: 4.48 {RATIO}
LYMPHOCYTES # BLD AUTO: 1.8 10*3/MM3 (ref 0.7–3.1)
LYMPHOCYTES NFR BLD AUTO: 26 % (ref 19.6–45.3)
MCH RBC QN AUTO: 31.5 PG (ref 26.6–33)
MCHC RBC AUTO-ENTMCNC: 33.8 G/DL (ref 31.5–35.7)
MCV RBC AUTO: 93.1 FL (ref 79–97)
MONOCYTES # BLD AUTO: 0.56 10*3/MM3 (ref 0.1–0.9)
MONOCYTES NFR BLD AUTO: 8.1 % (ref 5–12)
NEUTROPHILS NFR BLD AUTO: 4.1 10*3/MM3 (ref 1.7–7)
NEUTROPHILS NFR BLD AUTO: 59.1 % (ref 42.7–76)
NRBC BLD AUTO-RTO: 0 /100 WBC (ref 0–0.2)
PLATELET # BLD AUTO: 364 10*3/MM3 (ref 140–450)
PMV BLD AUTO: 10.3 FL (ref 6–12)
POTASSIUM SERPL-SCNC: 4.3 MMOL/L (ref 3.5–5.2)
PROT SERPL-MCNC: 6.3 G/DL (ref 6–8.5)
RBC # BLD AUTO: 4.22 10*6/MM3 (ref 3.77–5.28)
SODIUM SERPL-SCNC: 137 MMOL/L (ref 136–145)
TRIGL SERPL-MCNC: 133 MG/DL (ref 0–150)
TSH SERPL DL<=0.05 MIU/L-ACNC: 1.81 UIU/ML (ref 0.27–4.2)
VLDLC SERPL-MCNC: 24 MG/DL (ref 5–40)
WBC NRBC COR # BLD AUTO: 6.93 10*3/MM3 (ref 3.4–10.8)

## 2025-05-02 PROCEDURE — 84443 ASSAY THYROID STIM HORMONE: CPT | Performed by: NURSE PRACTITIONER

## 2025-05-02 PROCEDURE — 80053 COMPREHEN METABOLIC PANEL: CPT | Performed by: NURSE PRACTITIONER

## 2025-05-02 PROCEDURE — 83036 HEMOGLOBIN GLYCOSYLATED A1C: CPT | Performed by: NURSE PRACTITIONER

## 2025-05-02 PROCEDURE — 80061 LIPID PANEL: CPT | Performed by: NURSE PRACTITIONER

## 2025-05-02 PROCEDURE — 85025 COMPLETE CBC W/AUTO DIFF WBC: CPT | Performed by: NURSE PRACTITIONER

## 2025-05-02 PROCEDURE — 36415 COLL VENOUS BLD VENIPUNCTURE: CPT | Performed by: NURSE PRACTITIONER

## 2025-05-02 RX ORDER — OMEPRAZOLE 40 MG/1
40 CAPSULE, DELAYED RELEASE ORAL DAILY
Qty: 90 CAPSULE | Refills: 2 | Status: SHIPPED | OUTPATIENT
Start: 2025-05-02

## 2025-05-02 NOTE — PROGRESS NOTES
Subjective   Dawn Jarrell is a 45 y.o. female presents for   Chief Complaint   Patient presents with    Primary Care Follow-Up    Heartburn    Medication Problem     Lexapro        Health Maintenance Due   Topic Date Due    TDAP/TD VACCINES (2 - Td or Tdap) 10/07/2019       History of Present Illness  The patient presents for follow-up regarding heartburn and issues with Lexapro.    She has been on Lexapro for approximately 3 years, initially experiencing positive effects. However, over the past few months, a decline in its efficacy is perceived. The dosage was increased to 20 mg shortly after starting the medication. A lack of motivation to engage in activities beyond work and necessary tasks is reported, often resorting to lying on the couch or bed. Thoughts of self-harm are expressed but no specific plans or intentions are endorsed. No new stressors or changes that could have exacerbated her condition are identified. Her current state has not been disclosed to her family, fearing her mother's reaction. She resides with her children and does not believe inpatient care is necessary at this time. Approximately 20 years ago, following the birth of her daughter, Zoloft was prescribed for postpartum depression, which was discontinued when her daughter was 1 year old without experiencing any adverse effects.    She has been on semaglutide since 03/2025, resulting in a weight loss of approximately 22 pounds. One dose remains and she is unable to obtain additional compounded semaglutide.    Daily omeprazole is taken for heartburn, and a refill is needed. No flare-ups of heartburn or changes in swallowing are reported.    An ultrasound of her thyroid mass was performed over a year ago.    Vitals:    05/02/25 0811   BP: 128/85   BP Location: Left arm   Patient Position: Sitting   Cuff Size: Large Adult   Pulse: 78   Temp: 98.2 °F (36.8 °C)   TempSrc: Infrared   SpO2: 98%   Weight: 100 kg (220 lb 9.6 oz)   Height:  "172.7 cm (67.99\")     Body mass index is 33.55 kg/m².    Current Outpatient Medications on File Prior to Visit   Medication Sig Dispense Refill    aspirin 81 MG EC tablet Take 1 tablet by mouth Daily.      escitalopram (LEXAPRO) 20 MG tablet Take 1 tablet by mouth once daily 30 tablet 0    meclizine (ANTIVERT) 25 MG tablet Take 1 tablet by mouth 3 (Three) Times a Day As Needed for Dizziness. 90 tablet 1    multivitamin with minerals tablet tablet Take 1 tablet by mouth Daily.       No current facility-administered medications on file prior to visit.       The following portions of the patient's history were reviewed and updated as appropriate: allergies, current medications, past family history, past medical history, past social history, past surgical history, and problem list.    Review of Systems   Psychiatric/Behavioral:  Positive for depressed mood and stress.        Objective   Physical Exam  Vitals and nursing note reviewed.   Constitutional:       Appearance: Normal appearance. She is well-developed.   HENT:      Head: Normocephalic and atraumatic.      Right Ear: External ear normal.      Left Ear: External ear normal.      Nose: Nose normal.   Eyes:      Extraocular Movements: Extraocular movements intact.      Pupils: Pupils are equal, round, and reactive to light.   Cardiovascular:      Rate and Rhythm: Normal rate and regular rhythm.      Heart sounds: Normal heart sounds.   Pulmonary:      Effort: Pulmonary effort is normal.      Breath sounds: Normal breath sounds.   Abdominal:      General: Bowel sounds are normal.      Palpations: Abdomen is soft.   Genitourinary:     Vagina: Normal.   Musculoskeletal:         General: Normal range of motion.      Cervical back: Normal range of motion and neck supple.   Skin:     General: Skin is warm and dry.   Neurological:      General: No focal deficit present.      Mental Status: She is alert and oriented to person, place, and time.   Psychiatric:         Mood " and Affect: Mood normal.         Behavior: Behavior normal.         Thought Content: Thought content includes suicidal (thoughts of feel like everyone would be better off without her.) ideation. Thought content does not include suicidal plan.         Judgment: Judgment normal.          Neck: Thyroid is not palpable  Respiratory: Clear to auscultation, no wheezing, rales or rhonchi    PHQ-9 Total Score:      Results  Imaging   - Mammogram of right breast: 06/2024, Identified a mass, additional imaging confirmed it was a cyst.    Assessment & Plan   Diagnoses and all orders for this visit:    1. Moderate episode of recurrent major depressive disorder (Primary)  -     Cariprazine HCl (Vraylar) 1.5 MG capsule capsule; Take 1 capsule by mouth Daily.  Dispense: 30 capsule; Refill: 1  -     TSH    2. Gastroesophageal reflux disease, unspecified whether esophagitis present  -     omeprazole (priLOSEC) 40 MG capsule; Take 1 capsule by mouth Daily.  Dispense: 90 capsule; Refill: 2  -     CBC Auto Differential  -     Comprehensive Metabolic Panel    3. Thyroid mass  -     US Thyroid; Future    4. Screening for diabetes mellitus  -     Hemoglobin A1c    5. Mixed hyperlipidemia  -     Lipid Panel    6. Encounter for screening mammogram for malignant neoplasm of breast  -     Mammo Screening Digital Tomosynthesis Bilateral With CAD; Future    7. Obesity (BMI 30.0-34.9)  -     Tirzepatide-Weight Management (ZEPBOUND) 2.5 MG/0.5ML solution auto-injector; Inject 0.5 mL under the skin into the appropriate area as directed 1 (One) Time Per Week.  Dispense: 2 mL; Refill: 1         1. Major depression.  Her mood has been deteriorating over the past few months, with Lexapro 20 mg no longer providing the desired effect. She reports a lack of motivation and thoughts of self-harm without a plan.  The potential side effects of Vraylar, including insomnia, were discussed. She was advised to take Vraylar in the morning due to its activating  properties.  She was also informed about the suicide hotline and [place] in [facility] for immediate assistance if needed.  A prescription for Vraylar 1.5 mg will be provided, with samples given for the next 2 weeks. If insomnia occurs, she can take Vraylar every other day. If her condition worsens on Vraylar, she should contact the office immediately. If her insurance does not cover Vraylar, she will be provided with samples until a suitable alternative to Lexapro is found. If her mood improves, the possibility of weaning off Lexapro will be considered.    2. Heartburn.  She is currently taking omeprazole daily and reports no flare-ups.  A refill for omeprazole will be sent to her pharmacy.  Review of her labs from a year ago indicates the need for updated labs.  A thyroid level check will be conducted today to rule out any hormonal causes for her mood changes.    3. Weight management.  She has been on semaglutide since 03/2025 and has lost approximately 22 pounds.  Semaglutide is no longer available as of 05/22/2025.  A prescription for tirzepatide will be sent to [facility]. She will be monitored for any mood changes while on tirzepatide.    4. Thyroid mass.  She has a history of a thyroid mass, last evaluated with an ultrasound in 12/2023.  A thyroid level check will be conducted today to rule out any hormonal causes for her mood changes.  An ultrasound of the thyroid will be ordered due to the history of thyroid mass.    5. Health maintenance.  She is due for her annual mammogram in 06/2025.  An order for the mammogram will be placed today.    Follow-up  The patient will follow up in 2 weeks.    There are no Patient Instructions on file for this visit.         Patient or patient representative verbalized consent for the use of Ambient Listening during the visit with  CARLOS Catalan for chart documentation. 5/7/2025  08:38 EDT

## 2025-05-05 ENCOUNTER — PRIOR AUTHORIZATION (OUTPATIENT)
Dept: FAMILY MEDICINE CLINIC | Facility: CLINIC | Age: 45
End: 2025-05-05
Payer: COMMERCIAL

## 2025-05-06 DIAGNOSIS — F33.1 MODERATE EPISODE OF RECURRENT MAJOR DEPRESSIVE DISORDER: ICD-10-CM

## 2025-05-06 NOTE — TELEPHONE ENCOUNTER
Please notify patient that it was denied, but if it is helpful and she is feeling better, please provide her with the information for the Vraylar savings program.

## 2025-05-07 NOTE — TELEPHONE ENCOUNTER
Spoke w/ pt regarding prior auth denial. Did provide pt with information on the vraylar savings program. Pt verbalized understanding and will try the vraylor savings program based off the information that I gave her to see If that would help with the cost of the medication.

## 2025-05-19 ENCOUNTER — HOSPITAL ENCOUNTER (OUTPATIENT)
Dept: ULTRASOUND IMAGING | Facility: HOSPITAL | Age: 45
Discharge: HOME | End: 2025-05-19
Admitting: NURSE PRACTITIONER
Payer: COMMERCIAL

## 2025-05-19 DIAGNOSIS — E07.9 THYROID MASS: ICD-10-CM

## 2025-05-19 PROCEDURE — 76536 US EXAM OF HEAD AND NECK: CPT

## 2025-05-20 ENCOUNTER — OFFICE VISIT (OUTPATIENT)
Dept: FAMILY MEDICINE CLINIC | Facility: CLINIC | Age: 45
End: 2025-05-20
Payer: COMMERCIAL

## 2025-05-20 VITALS
HEART RATE: 81 BPM | TEMPERATURE: 97.5 F | WEIGHT: 223 LBS | BODY MASS INDEX: 33.8 KG/M2 | HEIGHT: 68 IN | SYSTOLIC BLOOD PRESSURE: 120 MMHG | OXYGEN SATURATION: 97 % | DIASTOLIC BLOOD PRESSURE: 68 MMHG

## 2025-05-20 DIAGNOSIS — F33.1 MODERATE EPISODE OF RECURRENT MAJOR DEPRESSIVE DISORDER: Primary | ICD-10-CM

## 2025-05-20 DIAGNOSIS — E66.811 OBESITY (BMI 30.0-34.9): ICD-10-CM

## 2025-05-20 PROCEDURE — 99214 OFFICE O/P EST MOD 30 MIN: CPT | Performed by: NURSE PRACTITIONER

## 2025-05-20 RX ORDER — ESCITALOPRAM OXALATE 20 MG/1
20 TABLET ORAL DAILY
Qty: 90 TABLET | Refills: 1 | Status: SHIPPED | OUTPATIENT
Start: 2025-05-20

## 2025-05-20 NOTE — PROGRESS NOTES
"Subjective   Dawn Jarrell is a 45 y.o. female presents for   Chief Complaint   Patient presents with    Depression       Health Maintenance Due   Topic Date Due    TDAP/TD VACCINES (2 - Td or Tdap) 10/07/2019       History of Present Illness  The patient presents for a follow-up on depression.    She reports an improvement in her depressive symptoms, with increased energy levels and motivation to engage in activities. However, she also experiences occasional manic episodes, such as the urge to start staining the deck at 8:00 PM or rearranging furniture around midnight. These behaviors have emerged since the initiation of her medication regimen, but she also feels it may be more noticeable since she previously was very unmotivated and had been staying in bed. Her sleep pattern has shifted to a later bedtime, but she maintains a good quality of sleep and feels refreshed upon waking. She takes Vraylar in the morning and does not experience any difficulty falling asleep. While she acknowledges that her condition is not fully resolved, she perceives a significant improvement compared to her previous state. She reports a decrease in suicidal ideation, which she manages by engaging in household projects when these thoughts arise. She is currently on Vraylar and Lexapro, which she takes in the morning. She is seeking a refill of her Lexapro prescription.    She continues to use tirzepatide, procured from a compounding pharmacy.    She underwent a thyroid ultrasound on 05/19/2025.    Vitals:    05/20/25 1313   BP: 120/68   BP Location: Left arm   Patient Position: Sitting   Cuff Size: Adult   Pulse: 81   Temp: 97.5 °F (36.4 °C)   TempSrc: Infrared   SpO2: 97%   Weight: 101 kg (223 lb)   Height: 172.7 cm (67.99\")     Body mass index is 33.92 kg/m².    Current Outpatient Medications on File Prior to Visit   Medication Sig Dispense Refill    aspirin 81 MG EC tablet Take 1 tablet by mouth Daily.      Cariprazine HCl " (Vraylar) 1.5 MG capsule capsule Take 1 capsule by mouth Daily. 30 capsule 0    meclizine (ANTIVERT) 25 MG tablet Take 1 tablet by mouth 3 (Three) Times a Day As Needed for Dizziness. 90 tablet 1    multivitamin with minerals tablet tablet Take 1 tablet by mouth Daily.      omeprazole (priLOSEC) 40 MG capsule Take 1 capsule by mouth Daily. 90 capsule 2    Tirzepatide-Weight Management (ZEPBOUND) 2.5 MG/0.5ML solution auto-injector Inject 0.5 mL under the skin into the appropriate area as directed 1 (One) Time Per Week. 2 mL 1    [DISCONTINUED] Cariprazine HCl (Vraylar) 1.5 MG capsule capsule Take 1 capsule by mouth Daily. 30 capsule 1    [DISCONTINUED] escitalopram (LEXAPRO) 20 MG tablet Take 1 tablet by mouth once daily 30 tablet 0     No current facility-administered medications on file prior to visit.       The following portions of the patient's history were reviewed and updated as appropriate: allergies, current medications, past family history, past medical history, past social history, past surgical history, and problem list.    Review of Systems   Neurological:  Negative for dizziness and confusion.   Psychiatric/Behavioral:  Positive for depressed mood.        Objective   Physical Exam  Vitals and nursing note reviewed.   Constitutional:       Appearance: Normal appearance. She is well-developed.   HENT:      Head: Normocephalic and atraumatic.      Right Ear: External ear normal.      Left Ear: External ear normal.      Nose: Nose normal.   Eyes:      Extraocular Movements: Extraocular movements intact.      Pupils: Pupils are equal, round, and reactive to light.   Cardiovascular:      Rate and Rhythm: Normal rate and regular rhythm.      Heart sounds: Normal heart sounds.   Pulmonary:      Effort: Pulmonary effort is normal.      Breath sounds: Normal breath sounds.   Abdominal:      General: Bowel sounds are normal.      Palpations: Abdomen is soft.   Genitourinary:     Vagina: Normal.   Musculoskeletal:          General: Normal range of motion.      Cervical back: Normal range of motion and neck supple.   Skin:     General: Skin is warm and dry.   Neurological:      General: No focal deficit present.      Mental Status: She is alert and oriented to person, place, and time.   Psychiatric:         Attention and Perception: Attention normal.         Mood and Affect: Mood and affect normal.         Speech: Speech normal.         Behavior: Behavior normal. Behavior is cooperative.         Thought Content: Thought content does not include suicidal ideation. Thought content does not include suicidal plan.         Judgment: Judgment normal.          Respiratory: Clear to auscultation, no wheezing, rales or rhonchi  Extremities: No edema    PHQ-9 Total Score:      Results      Assessment & Plan   Diagnoses and all orders for this visit:    1. Moderate episode of recurrent major depressive disorder (Primary)  -     Cariprazine HCl (Vraylar) 1.5 MG capsule capsule; Take 1 capsule by mouth Daily.  Dispense: 90 capsule; Refill: 1  -     escitalopram (LEXAPRO) 20 MG tablet; Take 1 tablet by mouth Daily.  Dispense: 90 tablet; Refill: 1    2. Obesity (BMI 30.0-34.9)         1. Depression.  - Reports significant improvement in depressive symptoms with increased energy and motivation.  - Currently taking Vraylar 1.5 mg in the morning and Lexapro; no trouble falling asleep and feels okay upon waking.  - GeneSight test discussed as a potential option to identify medications that work best with her genes if depressive symptoms remain and medication change made.  - A 90-day supply of Lexapro with a refill has been provided and sent to pharmacy. Advised to maintain open communication regarding any changes in her condition.    2. Weight management.  - Currently on tirzepatide through a compounding pharmacy.  - Follow-up appointment scheduled in 3 months to monitor weight and ensure there are no worsening side effects.    3. Thyroid  issues.  - Had an ultrasound of the thyroid yesterday; results have not been read yet.  - Will be notified via [employer] message as soon as the results are available.    Follow-up  - Follow-up in 3 months.    There are no Patient Instructions on file for this visit.         Patient or patient representative verbalized consent for the use of Ambient Listening during the visit with  CARLOS Catalan for chart documentation. 5/20/2025  13:32 EDT  Answers submitted by the patient for this visit:  Depression (Submitted on 5/18/2025)  Chief Complaint: Depression  Visit: follow-up  Frequency: most days  Severity: moderate  excessive worry: Yes  insomnia: No  irritability: Yes  malaise/fatigue: Yes  obsessions: Yes  hypersomnia: Yes  difficulty controlling mood: Yes  Medication compliance: %

## 2025-05-23 ENCOUNTER — TELEPHONE (OUTPATIENT)
Dept: FAMILY MEDICINE CLINIC | Facility: CLINIC | Age: 45
End: 2025-05-23
Payer: COMMERCIAL

## 2025-05-23 DIAGNOSIS — R53.83 FATIGUE, UNSPECIFIED TYPE: Primary | ICD-10-CM

## 2025-05-23 DIAGNOSIS — R53.83 FATIGUE, UNSPECIFIED TYPE: ICD-10-CM

## 2025-05-23 DIAGNOSIS — E66.811 OBESITY (BMI 30.0-34.9): ICD-10-CM

## 2025-05-23 NOTE — TELEPHONE ENCOUNTER
Rx needs ok to compound with B12 and B12 dx.     Rx Refill Note  Requested Prescriptions     Pending Prescriptions Disp Refills    Tirzepatide-Weight Management (ZEPBOUND) 2.5 MG/0.5ML solution auto-injector 2 mL 1     Sig: Inject 0.5 mL under the skin into the appropriate area as directed 1 (One) Time Per Week.      Last office visit with prescribing clinician: 5/20/2025   Last telemedicine visit with prescribing clinician: Visit date not found   Next office visit with prescribing clinician: 8/21/2025                         Would you like a call back once the refill request has been completed: [] Yes [] No    If the office needs to give you a call back, can they leave a voicemail: [] Yes [] No    Ailin Romano MA  05/23/25, 10:59 EDT

## 2025-05-23 NOTE — TELEPHONE ENCOUNTER
Caller: Dawn Jarrell    Relationship: Self    Best call back number: [phone]    Which medication are you concerned about: ZEPBOUND    Who prescribed you this medication: Bonnie Jane APRN    When did you start taking this medication:     What are your concerns: PHARMACY ADVISED THAT THEY NEED MORE INFORMATION ON THE PRESCRIPTION.    How long have you had these concerns:

## 2025-05-27 ENCOUNTER — PRIOR AUTHORIZATION (OUTPATIENT)
Dept: FAMILY MEDICINE CLINIC | Facility: CLINIC | Age: 45
End: 2025-05-27
Payer: COMMERCIAL

## 2025-05-27 DIAGNOSIS — E66.811 OBESITY (BMI 30.0-34.9): ICD-10-CM

## 2025-05-27 DIAGNOSIS — R53.83 FATIGUE, UNSPECIFIED TYPE: ICD-10-CM

## 2025-06-02 ENCOUNTER — PRIOR AUTHORIZATION (OUTPATIENT)
Dept: FAMILY MEDICINE CLINIC | Facility: CLINIC | Age: 45
End: 2025-06-02
Payer: COMMERCIAL

## 2025-06-04 ENCOUNTER — CLINICAL SUPPORT (OUTPATIENT)
Dept: FAMILY MEDICINE CLINIC | Facility: CLINIC | Age: 45
End: 2025-06-04
Payer: COMMERCIAL

## 2025-06-04 ENCOUNTER — LAB (OUTPATIENT)
Dept: FAMILY MEDICINE CLINIC | Facility: CLINIC | Age: 45
End: 2025-06-04
Payer: COMMERCIAL

## 2025-06-04 DIAGNOSIS — R35.0 FREQUENCY OF URINATION: Primary | ICD-10-CM

## 2025-06-04 LAB
BILIRUB BLD-MCNC: NEGATIVE MG/DL
CLARITY, POC: CLEAR
COLOR UR: YELLOW
EXPIRATION DATE: NORMAL
GLUCOSE UR STRIP-MCNC: NEGATIVE MG/DL
KETONES UR QL: NEGATIVE
LEUKOCYTE EST, POC: NEGATIVE
Lab: NORMAL
NITRITE UR-MCNC: NEGATIVE MG/ML
PH UR: 6 [PH] (ref 5–8)
PROT UR STRIP-MCNC: NEGATIVE MG/DL
RBC # UR STRIP: NEGATIVE /UL
SP GR UR: 1.03 (ref 1–1.03)
UROBILINOGEN UR QL: NORMAL

## 2025-06-13 ENCOUNTER — TELEPHONE (OUTPATIENT)
Dept: FAMILY MEDICINE CLINIC | Facility: CLINIC | Age: 45
End: 2025-06-13
Payer: COMMERCIAL

## 2025-06-13 NOTE — TELEPHONE ENCOUNTER
Caller: Dawn Jarrell    Relationship to patient: Self      Best call back number: [phone]    Provider:   ELIZABETH GIFFORD     Medication PA needed:   Cariprazine HCl (Vraylar) 1.5 MG capsule capsule     Reason for call/Prior Auth:   PATIENT STATES SHE IS OUT OF THE MEDICATION. THE PHARMACY TOLD THEM THEY SENT A REQUEST TO THE OFFICE 6/10 FOR THE PRIOR AUTH.    PLEASE CALL TO DISCUSS.

## 2025-06-13 NOTE — TELEPHONE ENCOUNTER
Good Morning Dawn Nicole was wondering if she could have some samples cause she is out and waiting on a PA

## 2025-07-10 ENCOUNTER — PRIOR AUTHORIZATION (OUTPATIENT)
Dept: FAMILY MEDICINE CLINIC | Facility: CLINIC | Age: 45
End: 2025-07-10
Payer: COMMERCIAL

## 2025-07-11 NOTE — TELEPHONE ENCOUNTER
I do not see a reason why this was denied in the chart.  Did we get an explanation?  She has been on it several months so I was not sure why it was just now denied

## 2025-07-15 ENCOUNTER — TELEPHONE (OUTPATIENT)
Dept: FAMILY MEDICINE CLINIC | Facility: CLINIC | Age: 45
End: 2025-07-15
Payer: COMMERCIAL

## 2025-07-15 NOTE — TELEPHONE ENCOUNTER
Caller:     Dawn Jarrell (Self) [phone] (Mobile)      Is it ok to leave a message: [x] Yes [] No    Requested medication for samples: VRAYLAR 1.5MG     How much medication does the patient currently have left: NONE     Who will be picking up the samples: HERSELF      Do you need information about patient financial assistance for this medication: [] Yes [] No    Additional details provided: DO YOU HAVE 2 MONTHS WORTH?     AND IS THERE AN UPDATE ON HER PRIOR AUTHORIZATION FOR THIS MEDICATION

## 2025-07-15 NOTE — TELEPHONE ENCOUNTER
Yes, ok to give samples.  Give 30 days if we have available, and we can ask the rep for more or for assistance with appeals process.

## 2025-07-15 NOTE — TELEPHONE ENCOUNTER
Dawn Jarrell notified and voiced comprehension and understanding.  Will be in in the morning to

## 2025-08-04 ENCOUNTER — PATIENT MESSAGE (OUTPATIENT)
Dept: FAMILY MEDICINE CLINIC | Facility: CLINIC | Age: 45
End: 2025-08-04
Payer: COMMERCIAL

## 2025-08-04 RX ORDER — TIRZEPATIDE 5 MG/.5ML
5 INJECTION, SOLUTION SUBCUTANEOUS WEEKLY
Qty: 2 ML | Refills: 3 | Status: SHIPPED | OUTPATIENT
Start: 2025-08-04

## 2025-08-21 ENCOUNTER — OFFICE VISIT (OUTPATIENT)
Dept: FAMILY MEDICINE CLINIC | Facility: CLINIC | Age: 45
End: 2025-08-21
Payer: COMMERCIAL

## 2025-08-21 VITALS
RESPIRATION RATE: 18 BRPM | HEART RATE: 57 BPM | DIASTOLIC BLOOD PRESSURE: 78 MMHG | SYSTOLIC BLOOD PRESSURE: 116 MMHG | BODY MASS INDEX: 33.89 KG/M2 | HEIGHT: 68 IN | TEMPERATURE: 97.3 F | WEIGHT: 223.6 LBS | OXYGEN SATURATION: 99 %

## 2025-08-21 DIAGNOSIS — Z71.6 ENCOUNTER FOR SMOKING CESSATION COUNSELING: ICD-10-CM

## 2025-08-21 DIAGNOSIS — F33.1 MODERATE EPISODE OF RECURRENT MAJOR DEPRESSIVE DISORDER: Primary | ICD-10-CM

## 2025-08-21 PROCEDURE — 99214 OFFICE O/P EST MOD 30 MIN: CPT | Performed by: NURSE PRACTITIONER

## 2025-08-21 RX ORDER — BUPROPION HYDROCHLORIDE 150 MG/1
150 TABLET ORAL DAILY
Qty: 30 TABLET | Refills: 6 | Status: SHIPPED | OUTPATIENT
Start: 2025-08-21

## (undated) DEVICE — SINGLE-USE BIOPSY FORCEPS: Brand: RADIAL JAW 4

## (undated) DEVICE — PK ENDO GI 50

## (undated) DEVICE — TRAP WIDEEYE POLYP